# Patient Record
Sex: FEMALE | Race: WHITE | NOT HISPANIC OR LATINO | Employment: FULL TIME | URBAN - METROPOLITAN AREA
[De-identification: names, ages, dates, MRNs, and addresses within clinical notes are randomized per-mention and may not be internally consistent; named-entity substitution may affect disease eponyms.]

---

## 2017-05-31 ENCOUNTER — OFFICE VISIT (OUTPATIENT)
Dept: MEDICAL GROUP | Facility: MEDICAL CENTER | Age: 57
End: 2017-05-31
Payer: COMMERCIAL

## 2017-05-31 VITALS
BODY MASS INDEX: 24.41 KG/M2 | WEIGHT: 151.9 LBS | SYSTOLIC BLOOD PRESSURE: 124 MMHG | DIASTOLIC BLOOD PRESSURE: 68 MMHG | OXYGEN SATURATION: 97 % | HEART RATE: 88 BPM | HEIGHT: 66 IN | TEMPERATURE: 98.6 F | RESPIRATION RATE: 16 BRPM

## 2017-05-31 DIAGNOSIS — Z13.6 SCREENING FOR CARDIOVASCULAR CONDITION: ICD-10-CM

## 2017-05-31 DIAGNOSIS — R23.3 EASY BRUISING: ICD-10-CM

## 2017-05-31 DIAGNOSIS — R23.3 PETECHIAE: ICD-10-CM

## 2017-05-31 DIAGNOSIS — Z13.1 SCREENING FOR DIABETES MELLITUS: ICD-10-CM

## 2017-05-31 DIAGNOSIS — G93.9 WHITE MATTER LESION OF CENTRAL NERVOUS SYSTEM: ICD-10-CM

## 2017-05-31 PROCEDURE — 99204 OFFICE O/P NEW MOD 45 MIN: CPT | Performed by: PHYSICIAN ASSISTANT

## 2017-05-31 ASSESSMENT — PAIN SCALES - GENERAL: PAINLEVEL: NO PAIN

## 2017-05-31 NOTE — ASSESSMENT & PLAN NOTE
2001 MRI- showed white lesion, was told by Dr. Eaton she had MS since she also had weakness and tingling but this resolved per pt.   Treated with steroids and other meds.   One days she just stopped. Hasn't had f/u MRI.  Pt asx.   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 Does not want to take medication.

## 2017-05-31 NOTE — PROGRESS NOTES
Chief Complaint   Patient presents with   • Establish Care     HPI:   Pily Anderson is a 56 y.o. female here to establish care    Easy bruising/rash   Has been taking 3 ibuprofen almost daily for several years.   This is for prevention of her intermittent low back pain 2/2 horse riding mild injury. No fracture.   Does have some bleeding in gums with brushing teeth, bruising on tongue from random accidental biting- went to dentist and was told she needs cleaning- hasn't done this yet.   Noticed rash on lower legs 3 days ago. Small pin point red rash mostly lower legs less up to thighs. Today noticed more on arms.  Has not been taking ibuprofen for last couple days.   Denies unwanted weight loss, fever, chills, urinary sx, fatigue, joint pain, muscle aching.   Not currently sexually active and hasn't been for years.     Current medicines (including changes today)  No current outpatient prescriptions on file.     No current facility-administered medications for this visit.     She  has no past medical history on file.  She  has no past surgical history on file.  Social History   Substance Use Topics   • Smoking status: Never Smoker    • Smokeless tobacco: Never Used   • Alcohol Use: Yes      Comment: 2 glasses of wine weekly.      Social History     Social History Narrative   • No narrative on file     Family History   Problem Relation Age of Onset   • Alzheimer's Disease Paternal Grandmother    • Cancer Neg Hx    • Diabetes Neg Hx    • Heart Disease Neg Hx    • Stroke Neg Hx      No family status information on file.       ROS  Constitutional: Negative for fever, chills, weight loss  HENT: Negative for ear pain, nosebleeds, congestion, sore throat and neck pain.   Eyes: Negative for blurred vision.   Respiratory: Negative for cough, sputum production, shortness of breath and wheezing.   Cardiovascular: Negative for chest pain, palpitations, orthopnea and leg swelling.   Gastrointestinal: Negative for heartburn,  "nausea, vomiting and abdominal pain.   Genitourinary: Negative for dysuria, urgency and frequency.   Musculoskeletal: Negative for myalgias, back pain and joint pain.   Skin: + rash and neg itching.   Neurological: Negative for dizziness, tingling, tremors, sensory change, focal weakness and headaches.   Endo/Heme/Allergies: + bruise/bleed easily.   Psychiatric/Behavioral: Negative for depression, anxiety, or memory loss.   All other systems reviewed and are negative except as in HPI.     Objective:     Blood pressure 124/68, pulse 88, temperature 37 °C (98.6 °F), resp. rate 16, height 1.676 m (5' 6\"), weight 68.9 kg (151 lb 14.4 oz), SpO2 97 %, not currently breastfeeding. Body mass index is 24.53 kg/(m^2).  Physical Exam:    Constitutional: Alert, no distress.  Skin: Warm, dry, good turgor, lower legs with scattering of 1-4mm nonblanching erythematous macules, more dense and more distal on the legs. Very few noted on arms bilaterally. No scaling, edema, warmth. Does have random ecchymosis noted on legs.   Eye: PERRLA, conjunctiva clear, lids normal, conjunctiva pink  ENMT: Lips without lesions, dentition good but has petetichia on hard palate, bruising on tongue bilaterally and on cheeks bilaterally at bite line, no bleeding of gums today.  Neck: Trachea midline, no masses, no thyromegaly.  Lymph: no cervical, axillary or inguinal lymphadenopathy noted  Respiratory: Unlabored respiratory effort, lungs clear to auscultation, no wheezes, no ronchi.  Cardiovascular: Normal S1, S2, no murmur, no edema.  Abdomen: Soft, non-tender, no masses, no hepatosplenomegaly.  Psych: Alert and oriented x3, normal affect and mood.    Assessment and Plan:   The following treatment plan was discussed     Easy bruising/petechiae  Discussed differential diagnosis with patient  Which included ITP also, vasculitis, liver disease, myelodysplastic disorders, etc. we've elected up-to-date and printed a differential diagnosis for her. "   Still patient was very persistent that this is only a thrombocytopenia secondary to her ibuprofen use.   We discussed doing a full workup which was recommended. Pt requesting only to do a CBC.   Also would like to look at liver function and kidney function, patient apprehensive about this.  We will be ordering a CMP mostly because of screening for diabetes  There are also ordering a cholesterol for screening for cardiovascular  Was able to include sedimentation rate although she declined any other autoimmune tests or any other lab  Workups or biopsy.  Patient is of sound body and mind. 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   Has worked at [employer] is in [employer].   Therefore we discussed again the thorough workup that is recommended. Patient declines and we are going to be on the ordering labs for CBC, CMP and sedimentation rate.      Records requested.  Followup: Return in about 2 weeks (around 6/14/2017) for labs/rash, sooner if needed.           Please note that this dictation was created using voice recognition software. I have made every reasonable attempt to correct obvious errors, but I expect that there are errors of grammar and possibly content that I did not discover before finalizing the note.

## 2017-05-31 NOTE — MR AVS SNAPSHOT
"        Pily Anderson   2017 2:00 PM   Office Visit   MRN: [id number]    Department:  [place] [address]   Dept Phone:  [phone]    Description:  Female : 1960   Provider:  Brenda Davis PA-C           Reason for Visit     Establish Care           Allergies as of 2017     No Known Allergies      You were diagnosed with     White matter lesion of central nervous system   [7674450]       Rash   [028754]       Screening for cardiovascular condition   [036558]       Screening for diabetes mellitus   [V77.1.ICD-9-CM]       Easy bruising   [330609]       Petechiae   [072199]         Vital Signs     Blood Pressure Pulse Temperature Respirations Height Weight    124/68 mmHg 88 37 °C (98.6 °F) 16 1.676 m (5' 6\") 68.9 kg (151 lb 14.4 oz)    Body Mass Index Oxygen Saturation Breastfeeding? Smoking Status          24.53 kg/m2 97% No Never Smoker         Basic Information     Date Of Birth Sex Race Ethnicity Preferred Language    1960 Female White Non- English      Your appointments     2017  1:40 PM   Established Patient with Brenda Davis PA-C   [facility]    [address]  [address]  [facility] [address] [phone]           You will be receiving a confirmation call a few days before your appointment from our automated call confirmation system.              Problem List              ICD-10-CM Priority Class Noted - Resolved    White matter lesion of central nervous system G93.89   2017 - Present    Petechiae R23.3   2017 - Present      Health Maintenance     Patient has no pending health maintenance at this time      Current Immunizations     No immunizations on file.      Below and/or attached are the medications your provider expects you to take. Review all of your home medications and newly ordered medications with your provider and/or pharmacist. Follow medication instructions as directed by your provider " and/or pharmacist. Please keep your medication list with you and share with your provider. Update the information when medications are discontinued, doses are changed, or new medications (including over-the-counter products) are added; and carry medication information at all times in the event of emergency situations     Allergies:  No Known Allergies          Medications  Valid as of: May 31, 2017 -  2:53 PM    Generic Name Brand Name Tablet Size Instructions for use    .                 Medicines prescribed today were sent to:     [address]), [place] [address] [place] [address] [facility] [place] [address]    Phone: [phone] Fax: [phone]    Open 24 Hours?: No      Medication refill instructions:       If your prescription bottle indicates you have medication refills left, it is not necessary to call your provider’s office. Please contact your pharmacy and they will refill your medication.    If your prescription bottle indicates you do not have any refills left, you may request refills at any time through one of the following ways: The online imgfave system (except Urgent Care), by calling your provider’s office, or by asking your pharmacy to contact your provider’s office with a refill request. Medication refills are processed only during regular business hours and may not be available until the next business day. Your provider may request additional information or to have a follow-up visit with you prior to refilling your medication.   *Please Note: Medication refills are assigned a new Rx number when refilled electronically. Your pharmacy may indicate that no refills were authorized even though a new prescription for the same medication is available at the pharmacy. Please request the medicine by name with the pharmacy before contacting your provider for a refill.        Your To Do List     Future Labs/Procedures Complete By [facility] WITH DIFFERENTIAL  As directed 6/1/2018     COMP METABOLIC PANEL  As directed 6/1/2018    LIPID PROFILE  As directed 6/1/2018    WESTERGREN SED RATE  As directed 6/1/2018         MyChart Status: Patient Declined

## 2017-06-02 ENCOUNTER — HOSPITAL ENCOUNTER (INPATIENT)
Facility: MEDICAL CENTER | Age: 57
LOS: 2 days | DRG: 813 | End: 2017-06-04
Attending: EMERGENCY MEDICINE | Admitting: HOSPITALIST
Payer: COMMERCIAL

## 2017-06-02 ENCOUNTER — RESOLUTE PROFESSIONAL BILLING HOSPITAL PROF FEE (OUTPATIENT)
Dept: HOSPITALIST | Facility: MEDICAL CENTER | Age: 57
End: 2017-06-02
Payer: COMMERCIAL

## 2017-06-02 DIAGNOSIS — D69.6 THROMBOCYTOPENIA (HCC): ICD-10-CM

## 2017-06-02 PROBLEM — D69.3 ACUTE ITP (HCC): Status: ACTIVE | Noted: 2017-06-02

## 2017-06-02 LAB
ABO GROUP BLD: NORMAL
ABO GROUP BLD: NORMAL
ANION GAP SERPL CALC-SCNC: 8 MMOL/L (ref 0–11.9)
APTT PPP: 26 SEC (ref 24.7–36)
BASOPHILS # BLD AUTO: 0.6 % (ref 0–1.8)
BASOPHILS # BLD: 0.04 K/UL (ref 0–0.12)
BLD GP AB SCN SERPL QL: NORMAL
BUN SERPL-MCNC: 17 MG/DL (ref 8–22)
CALCIUM SERPL-MCNC: 10 MG/DL (ref 8.5–10.5)
CHLORIDE SERPL-SCNC: 104 MMOL/L (ref 96–112)
CO2 SERPL-SCNC: 27 MMOL/L (ref 20–33)
COMMENT 1642: NORMAL
CREAT SERPL-MCNC: 0.87 MG/DL (ref 0.5–1.4)
DEPRECATED D DIMER PPP IA-ACNC: <200 NG/ML(D-DU)
EOSINOPHIL # BLD AUTO: 0.06 K/UL (ref 0–0.51)
EOSINOPHIL NFR BLD: 1 % (ref 0–6.9)
ERYTHROCYTE [DISTWIDTH] IN BLOOD BY AUTOMATED COUNT: 39.2 FL (ref 35.9–50)
FIBRINOGEN PPP-MCNC: 324 MG/DL (ref 215–460)
GFR SERPL CREATININE-BSD FRML MDRD: >60 ML/MIN/1.73 M 2
GLUCOSE SERPL-MCNC: 98 MG/DL (ref 65–99)
HCT VFR BLD AUTO: 47.5 % (ref 37–47)
HGB BLD-MCNC: 15.6 G/DL (ref 12–16)
IMM GRANULOCYTES # BLD AUTO: 0.02 K/UL (ref 0–0.11)
IMM GRANULOCYTES NFR BLD AUTO: 0.3 % (ref 0–0.9)
INR PPP: 0.92 (ref 0.87–1.13)
LDH SERPL-CCNC: 134 U/L (ref 107–266)
LYMPHOCYTES # BLD AUTO: 0.85 K/UL (ref 1–4.8)
LYMPHOCYTES NFR BLD: 13.5 % (ref 22–41)
MCH RBC QN AUTO: 28.4 PG (ref 27–33)
MCHC RBC AUTO-ENTMCNC: 32.8 G/DL (ref 33.6–35)
MCV RBC AUTO: 86.5 FL (ref 81.4–97.8)
MONOCYTES # BLD AUTO: 0.48 K/UL (ref 0–0.85)
MONOCYTES NFR BLD AUTO: 7.6 % (ref 0–13.4)
MORPHOLOGY BLD-IMP: NORMAL
NEUTROPHILS # BLD AUTO: 4.86 K/UL (ref 2–7.15)
NEUTROPHILS NFR BLD: 77 % (ref 44–72)
NRBC # BLD AUTO: 0 K/UL
NRBC BLD AUTO-RTO: 0 /100 WBC
PLATELET # BLD AUTO: 1 K/UL (ref 164–446)
PLATELET BLD QL SMEAR: NORMAL
PLATELETS.RETICULATED NFR BLD AUTO: 0 K/UL (ref 0.6–13.1)
POTASSIUM SERPL-SCNC: 3.7 MMOL/L (ref 3.6–5.5)
PROTHROMBIN TIME: 12.6 SEC (ref 12–14.6)
RBC # BLD AUTO: 5.49 M/UL (ref 4.2–5.4)
RBC BLD AUTO: PRESENT
RH BLD: NORMAL
SODIUM SERPL-SCNC: 139 MMOL/L (ref 135–145)
VARIANT LYMPHS BLD QL SMEAR: NORMAL
WBC # BLD AUTO: 6.3 K/UL (ref 4.8–10.8)

## 2017-06-02 PROCEDURE — 700102 HCHG RX REV CODE 250 W/ 637 OVERRIDE(OP): Performed by: SPECIALIST

## 2017-06-02 PROCEDURE — 80048 BASIC METABOLIC PNL TOTAL CA: CPT

## 2017-06-02 PROCEDURE — 85379 FIBRIN DEGRADATION QUANT: CPT

## 2017-06-02 PROCEDURE — 86900 BLOOD TYPING SEROLOGIC ABO: CPT

## 2017-06-02 PROCEDURE — 30233S1 TRANSFUSION OF NONAUTOLOGOUS GLOBULIN INTO PERIPHERAL VEIN, PERCUTANEOUS APPROACH: ICD-10-PCS | Performed by: HOSPITALIST

## 2017-06-02 PROCEDURE — 86901 BLOOD TYPING SEROLOGIC RH(D): CPT

## 2017-06-02 PROCEDURE — 99285 EMERGENCY DEPT VISIT HI MDM: CPT

## 2017-06-02 PROCEDURE — 83615 LACTATE (LD) (LDH) ENZYME: CPT

## 2017-06-02 PROCEDURE — 85384 FIBRINOGEN ACTIVITY: CPT

## 2017-06-02 PROCEDURE — 770009 HCHG ROOM/CARE - ONCOLOGY SEMI PRI*

## 2017-06-02 PROCEDURE — 85055 RETICULATED PLATELET ASSAY: CPT

## 2017-06-02 PROCEDURE — 85610 PROTHROMBIN TIME: CPT

## 2017-06-02 PROCEDURE — 86850 RBC ANTIBODY SCREEN: CPT

## 2017-06-02 PROCEDURE — A9270 NON-COVERED ITEM OR SERVICE: HCPCS | Performed by: SPECIALIST

## 2017-06-02 PROCEDURE — 85025 COMPLETE CBC W/AUTO DIFF WBC: CPT

## 2017-06-02 PROCEDURE — 85730 THROMBOPLASTIN TIME PARTIAL: CPT

## 2017-06-02 PROCEDURE — 99223 1ST HOSP IP/OBS HIGH 75: CPT | Performed by: HOSPITALIST

## 2017-06-02 PROCEDURE — 36415 COLL VENOUS BLD VENIPUNCTURE: CPT

## 2017-06-02 PROCEDURE — 700111 HCHG RX REV CODE 636 W/ 250 OVERRIDE (IP): Performed by: HOSPITALIST

## 2017-06-02 RX ORDER — OMEPRAZOLE 20 MG/1
20 CAPSULE, DELAYED RELEASE ORAL EVERY 12 HOURS
Status: DISCONTINUED | OUTPATIENT
Start: 2017-06-02 | End: 2017-06-04 | Stop reason: HOSPADM

## 2017-06-02 RX ORDER — IMMUNE GLOBULIN 50 MG/ML
10 SOLUTION INTRAVENOUS DAILY
Status: DISCONTINUED | OUTPATIENT
Start: 2017-06-02 | End: 2017-06-03

## 2017-06-02 RX ORDER — METHYLPREDNISOLONE SODIUM SUCCINATE 125 MG/2ML
125 INJECTION, POWDER, LYOPHILIZED, FOR SOLUTION INTRAMUSCULAR; INTRAVENOUS EVERY 12 HOURS
Status: DISCONTINUED | OUTPATIENT
Start: 2017-06-02 | End: 2017-06-04 | Stop reason: HOSPADM

## 2017-06-02 RX ORDER — IBUPROFEN 200 MG
800 TABLET ORAL
Status: ON HOLD | COMMUNITY
End: 2017-06-04

## 2017-06-02 RX ORDER — BISACODYL 10 MG
10 SUPPOSITORY, RECTAL RECTAL
Status: DISCONTINUED | OUTPATIENT
Start: 2017-06-02 | End: 2017-06-02

## 2017-06-02 RX ORDER — AMOXICILLIN 250 MG
2 CAPSULE ORAL 2 TIMES DAILY
Status: DISCONTINUED | OUTPATIENT
Start: 2017-06-02 | End: 2017-06-04 | Stop reason: HOSPADM

## 2017-06-02 RX ORDER — ACETAMINOPHEN 325 MG/1
650 TABLET ORAL EVERY 6 HOURS PRN
Status: DISCONTINUED | OUTPATIENT
Start: 2017-06-02 | End: 2017-06-04 | Stop reason: HOSPADM

## 2017-06-02 RX ORDER — POLYETHYLENE GLYCOL 3350 17 G/17G
1 POWDER, FOR SOLUTION ORAL
Status: DISCONTINUED | OUTPATIENT
Start: 2017-06-02 | End: 2017-06-04 | Stop reason: HOSPADM

## 2017-06-02 RX ADMIN — OMEPRAZOLE 20 MG: 20 CAPSULE, DELAYED RELEASE ORAL at 23:16

## 2017-06-02 RX ADMIN — IMMUNE GLOBULIN 10 G: 50 SOLUTION INTRAVENOUS at 21:02

## 2017-06-02 RX ADMIN — IMMUNE GLOBULIN 10 G: 50 SOLUTION INTRAVENOUS at 23:17

## 2017-06-02 ASSESSMENT — PAIN SCALES - GENERAL
PAINLEVEL_OUTOF10: 0
PAINLEVEL_OUTOF10: 0

## 2017-06-02 NOTE — IP AVS SNAPSHOT
" <p align=\"LEFT\"><IMG SRC=\"//[email or web address]$/[email or web address]\" alt=\"Image\" WIDTH=\"50%\" HEIGHT=\"200\" BORDER=\"\"></p>                   Name:Pily Anderson  Medical Record Number:[id number]  CSN: [id number]    YOB: 1960   Age: 56 y.o.  Sex: female  HT:1.676 m (5' 6\") WT: 66.4 kg (146 lb 6.2 oz)          Admit Date: 6/2/2017     Discharge Date:   Today's Date: 6/4/2017  Attending Doctor:  Annette Cavazos M.D.                  Allergies:  Review of patient's allergies indicates no known allergies.          Your appointments     Jun 13, 2017  1:40 PM   Established Patient with Brenda Davis PA-C   [facility]    [address]  [facility] [address]  [facility] [address] [phone]           You will be receiving a confirmation call a few days before your appointment from our automated call confirmation system.              Follow-up Information     1. Follow up with MADDISON Coronado M.D.. Schedule an appointment as soon as possible for a visit in 10 days.    Specialty:  Oncology    Why:  for follow up as discussed    Contact information    [address] [facility] [address] [phone]           Medication List      Take these Medications        Instructions    dexamethasone 4 MG Tabs   Commonly known as:  DECADRON    Take 10 Tabs by mouth 2 times a day.   Dose:  40 mg       omeprazole 20 MG delayed-release capsule   Commonly known as:  PRILOSEC    Take 1 Cap by mouth every 12 hours.   Dose:  20 mg         "

## 2017-06-02 NOTE — ED NOTES
"Chief Complaint   Patient presents with   • Sent by MD   • Abnormal Labs     \"low platelets\"    Pt to triage in NAD.  Pt reports she noticed petechiae on BLE approximately 1 week ago and that she has been bruising more easily.  Pt reports she has taken ibuprofen daily for several years for back pain.  Pt educated on triage process and instructed to notify triage RN of any change in status.               "

## 2017-06-02 NOTE — IP AVS SNAPSHOT
6/4/2017    Pily WASSERMAN Delfinorobert  [address]  [place] [address]    Dear Pily:    [facility] wants to ensure your discharge home is safe and you or your loved ones have had all of your questions answered regarding your care after you leave the hospital.    Below is a list of resources and contact information should you have any questions regarding your hospital stay, follow-up instructions, or active medical symptoms.    Questions or Concerns Regarding… Contact   Medical Questions Related to Your Discharge  (7 days a week, 8am-5pm) Contact a Nurse Care Coordinator   [phone]   Medical Questions Not Related to Your Discharge  (24 hours a day / 7 days a week)  Contact the Nurse Health Line   [phone]    Medications or Discharge Instructions Refer to your discharge packet   or contact your [facility] Primary Care Provider   [phone]   Follow-up Appointment(s) Schedule your appointment via Socure   or contact Scheduling [phone]   Billing Review your statement via Socure  or contact Billing [phone]   Medical Records Review your records via Socure   or contact Medical Records [phone]     You may receive a telephone call within two days of discharge. This call is to make certain you understand your discharge instructions and have the opportunity to have any questions answered. You can also easily access your medical information, test results and upcoming appointments via the Socure free online health management tool. You can learn more and sign up at [employer]/Socure. For assistance setting up your Socure account, please call [phone].    Once again, we want to ensure your discharge home is safe and that you have a clear understanding of any next steps in your care. If you have any questions or concerns, please do not hesitate to contact us, we are here for you. Thank you for choosing [facility] for your healthcare needs.    Sincerely,    Your [facility] Healthcare Team

## 2017-06-02 NOTE — ED NOTES
Tech from Lab called with critical result of platelets of 1 at 1645. Critical lab result read back to tech.   Dr. Johnson notified of critical lab result at 1645.  Critical lab result read back by Dr. Johnson.

## 2017-06-02 NOTE — IP AVS SNAPSHOT
Insmed Access Code: [email or web address]  Expires: 6/30/2017  2:55 PM    Insmed  A secure, online tool to manage your health information     [employer]’s Insmed® is a secure, online tool that connects you to your personalized health information from the privacy of your home -- day or night - making it very easy for you to manage your healthcare. Once the activation process is completed, you can even access your medical information using the Insmed colby, which is available for free in the Apple Colby store or Google Play store.     Insmed provides the following levels of access (as shown below):   My Chart Features   [facility] Primary Care Doctor [facility]  Specialists [facility]  Urgent  Care Non-[facility]  Primary Care  Doctor   Email your healthcare team securely and privately 24/7 X X X X   Manage appointments: schedule your next appointment; view details of past/upcoming appointments X      Request prescription refills. X      View recent personal medical records, including lab and immunizations X X X X   View health record, including health history, allergies, medications X X X X   Read reports about your outpatient visits, procedures, consult and ER notes X X X X   See your discharge summary, which is a recap of your hospital and/or ER visit that includes your diagnosis, lab results, and care plan. X X       How to register for Insmed:  1. Go to  [email or web address].  2. Click on the Sign Up Now box, which takes you to the New Member Sign Up page. You will need to provide the following information:  a. Enter your Insmed Access Code exactly as it appears at the top of this page. (You will not need to use this code after you’ve completed the sign-up process. If you do not sign up before the expiration date, you must request a new code.)   b. Enter your date of birth.   c. Enter your home email address.   d. Click Submit, and follow the next screen’s instructions.  3. Create a Insmed ID. This will be your Insmed  login ID and cannot be changed, so think of one that is secure and easy to remember.  4. Create a [employer] password. You can change your password at any time.  5. Enter your Password Reset Question and Answer. This can be used at a later time if you forget your password.   6. Enter your e-mail address. This allows you to receive e-mail notifications when new information is available in [employer].  7. Click Sign Up. You can now view your health information.    For assistance activating your [employer] account, call [phone]

## 2017-06-02 NOTE — IP AVS SNAPSHOT
" Home Care Instructions                                                                                                                  Name:Pily Anderson  Medical Record Number:[id number]  CSN: [id number]    YOB: 1960   Age: 56 y.o.  Sex: female  HT:1.676 m (5' 6\") WT: 66.4 kg (146 lb 6.2 oz)          Admit Date: 6/2/2017     Discharge Date:   Today's Date: 6/4/2017  Attending Doctor:  Annette Cavazos M.D.                  Allergies:  Review of patient's allergies indicates no known allergies.            Discharge Instructions       Discharge Instructions    Discharged to home by car with self. Discharged via wheelchair, hospital escort: Yes.  Special equipment needed: Not Applicable    Be sure to schedule a follow-up appointment with your primary care doctor or any specialists as instructed.     Discharge Plan:   Diet Plan: Discussed  Activity Level: Discussed  Confirmed Follow up Appointment: Patient to Call and Schedule Appointment  Confirmed Symptoms Management: Discussed  Medication Reconciliation Updated: Yes  Influenza Vaccine Indication: Patient Refuses    I understand that a diet low in cholesterol, fat, and sodium is recommended for good health. Unless I have been given specific instructions below for another diet, I accept this instruction as my diet prescription.   Other diet: regular as tolerated    Special Instructions:     See Dr Coronado in office in 7-10 days    Lab work late next week (Thursday or Friday as discussed)    High dose Decadron for four days; asked patient to consider splenectomy if recurrence    · Is patient discharged on Warfarin / Coumadin?   No     · Is patient Post Blood Transfusion?  No      Idiopathic Thrombocytopenic Purpura  Idiopathic thrombocytopenic purpura (ITP) is a disease in which your body's defense system (immune system) attacks your platelets. Platelets are blood cells that help form clots and seal leaks in damaged blood vessels. With ITP, you to have " too few platelets. As a result, you bleed more easily. It is also harder for your body to stop any bleeding.  In adults, ITP is usually a long-term disease.  CAUSES  The cause is unknown. ITP may develop after a viral infection, during pregnancy, or from an immune disorder.  RISK FACTORS  The risk of ITP may be greater among:  Women.  Adults 20-50 years old.  SIGNS AND SYMPTOMS  Common signs and symptoms include:  Easy bruising.  A cut that bleeds for a long time.  Tiny purple blood dots (petechiae) under the skin, especially on the shins.  Blood in urine or bowel movements.  Nosebleeds.  Bleeding gums.  Heavy menstrual periods.  Mild forms of ITP may not cause any symptoms.  DIAGNOSIS  Your health care provider may suspect ITP based on your signs and symptoms. To make a diagnosis, your health care provider may do a physical exam and order blood tests to:  Find how many platelets you have.  See how well your blood clots.  Your health care provider may then do blood tests or a bone marrow test to rule out other conditions that could be causing your symptoms.  TREATMENT  Treatment depends on the severity of your condition. Options include:  Monitoring of your condition and platelet count.  Blood transfusions of antibodies or platelets.  Medicines such as:  Strong anti-inflammatory medicines (steroids).  Medicines to boost platelet production.  Medicines to suppress your immune system.  Removal of your spleen. This may be done if other treatments do not work.  HOME CARE INSTRUCTIONS  Take medicines only as directed by your health care provider.  Do not take over-the-counter medicines that lower your platelet count, affect platelet function, or affect your blood's ability to clot. These include aspirin and ibuprofen.  Do not participate in contact sports or other high-risk activities. Ask your health care provider which activities are safe for you.  Keep all follow-up visits as directed by your health care provider.  This is important.  SEEK MEDICAL CARE IF:  You have new symptoms.  Your symptoms get worse.  SEEK IMMEDIATE MEDICAL CARE IF:  You have a sudden severe headache or confusion.  You have significant bleeding.  You have nausea and vomiting.     This information is not intended to replace advice given to you by your health care provider. Make sure you discuss any questions you have with your health care provider.     Document Released: 07/15/2015 Document Reviewed: 07/15/2015  [employer] Interactive Patient Education ©2016 [employer]      Depression / Suicide Risk    As you are discharged from this [facility] facility, it is important to learn how to keep safe from harming yourself.    Recognize the warning signs:  · Abrupt changes in personality, positive or negative- including increase in energy   · Giving away possessions  · Change in eating patterns- significant weight changes-  positive or negative  · Change in sleeping patterns- unable to sleep or sleeping all the time   · Unwillingness or inability to communicate  · Depression  · Unusual sadness, discouragement and loneliness  · Talk of wanting to die  · Neglect of personal appearance   · Rebelliousness- reckless behavior  · Withdrawal from people/activities they love  · Confusion- inability to concentrate     If you or a loved one observes any of these behaviors or has concerns about self-harm, here's what you can do:  · Talk about it- your feelings and reasons for harming yourself  · Remove any means that you might use to hurt yourself (examples: pills, rope, extension cords, firearm)  · Get professional help from the community (Mental Health, Substance Abuse, psychological counseling)  · Do not be alone:Call your Safe Contact- someone whom you trust who will be there for you.  · Call your local CRISIS HOTLINE 407-2893 or 080-076-1043  · Call your local Children's Mobile Crisis Response Team [place] (793) 314-2050 or www.[facility]  · Call the  toll free National Suicide Prevention Hotlines   · National Suicide Prevention Lifeline 510-111-JZQT (9930)  · National Hope Line Network 800-SUICIDE (764-7627)        Your appointments     Jun 13, 2017  1:40 PM   [facility]    [address]  [address]  [place] [id number] [phone]           You will be receiving a confirmation call a few days before your appointment from our automated call confirmation system.              Follow-up Information     1. Follow up with MADDISON Coronado M.D.. Schedule an appointment as soon as possible for a visit in 10 days.    Specialty:  Oncology    Why:  for follow up as discussed    Contact information    [address] [place] [address] [phone]           Discharge Medication Instructions:    Below are the medications your physician expects you to take upon discharge:    Review all your home medications and newly ordered medications with your doctor and/or pharmacist. Follow medication instructions as directed by your doctor and/or pharmacist.    Please keep your medication list with you and share with your physician.               Medication List      START taking these medications        Instructions    Morning Afternoon Evening Bedtime    dexamethasone 4 MG Tabs   Commonly known as:  DECADRON        Take 10 Tabs by mouth 2 times a day.   Dose:  40 mg                        omeprazole 20 MG delayed-release capsule   Last time this was given:  20 mg on 6/3/2017  8:30 PM   Commonly known as:  PRILOSEC        Take 1 Cap by mouth every 12 hours.   Dose:  20 mg                          STOP taking these medications     asa/apap/caffeine 250-250-65 MG Tabs   Commonly known as:  EXCEDRIN               ibuprofen 200 MG Tabs   Commonly known as:  MOTRIN                    Where to Get Your Medications      These medications were sent to [facility] [place] -  [address]  [address] [place] [address]     Phone:  [phone]    - dexamethasone 4 MG Tabs  - omeprazole 20 MG delayed-release capsule            Instructions           Diet / Nutrition:    Follow any diet instructions given to you by your doctor or the dietician, including how much salt (sodium) you are allowed each day.    If you are overweight, talk to your doctor about a weight reduction plan.    Activity:    Remain physically active following your doctor's instructions about exercise and activity.    Rest often.     Any time you become even a little tired or short of breath, SIT DOWN and rest.    Worsening Symptoms:    Report any of the following signs and symptoms to the doctor's office immediately:    *Pain of jaw, arm, or neck  *Chest pain not relieved by medication                               *Dizziness or loss of consciousness  *Difficulty breathing even when at rest   *More tired than usual                                       *Bleeding drainage or swelling of surgical site  *Swelling of feet, ankles, legs or stomach                 *Fever (>100ºF)  *Pink or blood tinged sputum  *Weight gain (3lbs/day or 5lbs /week)           *Shock from internal defibrillator (if applicable)  *Palpitations or irregular heartbeats                *Cool and/or numb extremities    Stroke Awareness    Common Risk Factors for Stroke include:    Age  Atrial Fibrillation  Carotid Artery Stenosis  Diabetes Mellitus  Excessive alcohol consumption  High blood pressure  Overweight   Physical inactivity  Smoking    Warning signs and symptoms of a stroke include:    *Sudden numbness or weakness of the face, arm or leg (especially on one side of the body).  *Sudden confusion, trouble speaking or understanding.  *Sudden trouble seeing in one or both eyes.  *Sudden trouble walking, dizziness, loss of balance or coordination.Sudden severe headache with no known cause.    It is very important to get treatment quickly  when a stroke occurs. If you experience any of the above warning signs, call 911 immediately.                   Disclaimer         Quit Smoking / Tobacco Use:    I understand the use of any tobacco products increases my chance of suffering from future heart disease or stroke and could cause other illnesses which may shorten my life. Quitting the use of tobacco products is the single most important thing I can do to improve my health. For further information on smoking / tobacco cessation call a Toll Free Quit Line at 1-325.511.4324 (*National Cancer [place]) or 1-501.479.9472 (American Lung Association) or you can access the web based program at www.lungusa.org.    [place] Tobacco Users Help Line:  (803) 447-9728       Toll Free: 1-394.248.6141  Quit Tobacco Program [facility] (846)977-7234    Crisis Hotline:    [place] Crisis Hotline:  2-498-ECZYAOZ or 1-242.562.1865    [place] Crisis Hotline:    1-134.963.6739 or 886-919-7457    Discharge Survey:   Thank you for choosing [facility]. We hope we did everything we could to make your hospital stay a pleasant one. You may be receiving a phone survey and we would appreciate your time and participation in answering the questions. Your input is very valuable to us in our efforts to improve our service to our patients and their families.        My signature on this form indicates that:    1. I have reviewed and understand the above information.  2. My questions regarding this information have been answered to my satisfaction.  3. I have formulated a plan with my discharge nurse to obtain my prescribed medications for home.                  Disclaimer         __________________________________                     __________       ________                       Patient Signature                                                 Date                    Time

## 2017-06-02 NOTE — ED PROVIDER NOTES
"ED Provider Note    Scribed for Suzie Johnson M.D. by Tamara Gaitan. 6/2/2017, 3:03 PM.    Primary care provider: Brenda Davis PA-C  Means of arrival: Walk-in  History obtained from: Patient   History limited by: None    CHIEF COMPLAINT  Chief Complaint   Patient presents with   • Sent by MD   • Abnormal Labs     \"low platelets\"        HPI  Pily Anderson is a 56 y.o. female who presents to the Emergency Department for low platelets. The patient was told to report to the ED after her PABrenda received lab results from [employer] that indicated the patient had low platelets. She reports that she has been bruising easily. The patient also states that when she brushed her teeth she noticed reddening in her tongue which has improved today. She denies any nose bleeds. The patient takes 600mg of Ibuprofen once a day, 5 days week  secondary to back pain. She also takes Excedrin for headaches. The patient denies being on antibiotics recently. Her mother had history of low platelets.      REVIEW OF SYSTEMS  Pertinent positives include low platelet count, bruising. Pertinent negatives include no fever, nose bleeds, vomiting. See HPI for further details. All other systems reviewed and negative.   C.    PAST MEDICAL HISTORY   No past medical history noted.     SURGICAL HISTORY  patient denies any surgical history    SOCIAL HISTORY  Social History   Substance Use Topics   • Smoking status: Never Smoker    • Smokeless tobacco: Never Used   • Alcohol Use: Yes      Comment: 2 glasses of wine weekly.       History   Drug Use No     Patient is originally from [place]. She has a medical degree as well as a PhD in biochemistry. She is currently working as a .    FAMILY HISTORY  Family History   Problem Relation Age of Onset   • Alzheimer's Disease Paternal Grandmother    • Cancer Neg Hx    • Diabetes Neg Hx    • Heart Disease Neg Hx    • Stroke Neg Hx        CURRENT MEDICATIONS  Ibuprofen and Excerdrin " "      ALLERGIES  No Known Allergies    PHYSICAL EXAM  VITAL SIGNS: /84 mmHg  Pulse 87  Temp(Src) 37.3 °C (99.1 °F) (Temporal)  Resp 16  Ht 1.676 m (5' 6\")  Wt 66.1 kg (145 lb 11.6 oz)  BMI 23.53 kg/m2  SpO2 100%    General: WDWN, nontoxic appearing in NAD; A+Ox3; V/S as above    Skin: warm and dry; good color; no rash. Petechiae to bilateral legs with multiple areas of ecchymosis over her extremities  HEENT: NCAT; EOMs intact; PERRL; no scleral icterus   Neck: FROM; no meningismus, no LAD   Cardiovascular: Regular heart rate and rhythm. No murmurs, rubs, or gallops; pulses 2+ bilaterally radially and DP areas   Lungs: Clear to auscultation with good air movement bilaterally. No wheezes, rhonchi, or rales.   Abdomen: BS present; soft; NTND; no rebound, guarding, or rigidity. No organomegaly or pulsatile mass  Extremities: CHOWDHURY x 4; no pedal edema   Neurologic: CNs III-XII grossly intact; speech clear; distal sensation intact; strength 5/5 UE/LEs  Psychiatric: Appropriate affect, normal mood                                                           DIAGNOSTIC STUDIES / PROCEDURES    LABS  Results for orders placed or performed during the hospital encounter of 06/02/17   CBC WITH DIFFERENTIAL   Result Value Ref Range    WBC 6.3 4.8 - 10.8 K/uL    RBC 5.49 (H) 4.20 - 5.40 M/uL    Hemoglobin 15.6 12.0 - 16.0 g/dL    Hematocrit 47.5 (H) 37.0 - 47.0 %    MCV 86.5 81.4 - 97.8 fL    MCH 28.4 27.0 - 33.0 pg    MCHC 32.8 (L) 33.6 - 35.0 g/dL    RDW 39.2 35.9 - 50.0 fL    Platelet Count 1 (LL) 164 - 446 K/uL    Neutrophils-Polys 77.00 (H) 44.00 - 72.00 %    Lymphocytes 13.50 (L) 22.00 - 41.00 %    Monocytes 7.60 0.00 - 13.40 %    Eosinophils 1.00 0.00 - 6.90 %    Basophils 0.60 0.00 - 1.80 %    Immature Granulocytes 0.30 0.00 - 0.90 %    Nucleated RBC 0.00 /100 WBC    Neutrophils (Absolute) 4.86 2.00 - 7.15 K/uL    Lymphs (Absolute) 0.85 (L) 1.00 - 4.80 K/uL    Monos (Absolute) 0.48 0.00 - 0.85 K/uL    Eos " (Absolute) 0.06 0.00 - 0.51 K/uL    Baso (Absolute) 0.04 0.00 - 0.12 K/uL    Immature Granulocytes (abs) 0.02 0.00 - 0.11 K/uL    NRBC (Absolute) 0.00 K/uL   BASIC METABOLIC PANEL   Result Value Ref Range    Sodium 139 135 - 145 mmol/L    Potassium 3.7 3.6 - 5.5 mmol/L    Chloride 104 96 - 112 mmol/L    Co2 27 20 - 33 mmol/L    Glucose 98 65 - 99 mg/dL    Bun 17 8 - 22 mg/dL    Creatinine 0.87 0.50 - 1.40 mg/dL    Calcium 10.0 8.5 - 10.5 mg/dL    Anion Gap 8.0 0.0 - 11.9   PROTHROMBIN TIME   Result Value Ref Range    PT 12.6 12.0 - 14.6 sec    INR 0.92 0.87 - 1.13   ESTIMATED GFR   Result Value Ref Range    GFR If African American >60 >60 mL/min/1.73 m 2    GFR If Non African American >60 >60 mL/min/1.73 m 2     All labs reviewed by me.    COURSE & MEDICAL DECISION MAKING  Pertinent Labs & Imaging studies reviewed. (See chart for details)    Pily Anderson is a 56 y.o. female who presents complaining of low platelets. These were found on an outpatient draw 2 days ago. She just received notice from her PCP to come to the ER today. Patient reports noting petechiae several days ago. They are persistent. She has multiple areas of ecchymosis. I suspect ITP. She does not have a fever and has not altered. I do not suspect TTP.      3:03 PM - Patient seen and examined at bedside. Ordered CBC with differential, BMP, Prothrombin Time to evaluate her symptoms.    4:50 PM I discussed the case with Dr. Martínez from the hospitalist service who agrees with the patient. I will call hematology.    We were called by the lab with platelet count of 1.    4:58 PM - I discussed the patient's case and the above findings with hematology who requests LDH be added to her labs. He will call Dr. Martínez with recommendations.      DISPOSITION:  Patient will be admitted to Dr. Martínez (Hospitalist) in guarded condition.      FINAL IMPRESSION  1. Thrombocytopenia (CMS-HCC)          ITamara (Scribe), am scribing for, and in the  presence of, Suzie Johnson M.D..    Electronically signed by: Tamara Gaitan (Scribe), 6/2/2017    I, Suzie Johnson M.D. personally performed the services described in this documentation, as scribed by Tamara Gaitan in my presence, and it is both accurate and complete.    The note accurately reflects work and decisions made by me.  Suzie Johnson  6/2/2017  10:10 PM

## 2017-06-03 LAB
ALBUMIN SERPL BCP-MCNC: 3.8 G/DL (ref 3.2–4.9)
ALBUMIN SERPL BCP-MCNC: 4 G/DL (ref 3.2–4.9)
ALBUMIN/GLOB SERPL: 0.8 G/DL
ALBUMIN/GLOB SERPL: 1.2 G/DL
ALP SERPL-CCNC: 64 U/L (ref 30–99)
ALP SERPL-CCNC: 70 U/L (ref 30–99)
ALT SERPL-CCNC: 11 U/L (ref 2–50)
ALT SERPL-CCNC: 9 U/L (ref 2–50)
ANION GAP SERPL CALC-SCNC: 7 MMOL/L (ref 0–11.9)
ANION GAP SERPL CALC-SCNC: 8 MMOL/L (ref 0–11.9)
AST SERPL-CCNC: 11 U/L (ref 12–45)
AST SERPL-CCNC: 12 U/L (ref 12–45)
BASOPHILS # BLD AUTO: 0.7 % (ref 0–1.8)
BASOPHILS # BLD AUTO: 0.7 % (ref 0–1.8)
BASOPHILS # BLD: 0.02 K/UL (ref 0–0.12)
BASOPHILS # BLD: 0.03 K/UL (ref 0–0.12)
BILIRUB SERPL-MCNC: 0.7 MG/DL (ref 0.1–1.5)
BILIRUB SERPL-MCNC: 0.7 MG/DL (ref 0.1–1.5)
BUN SERPL-MCNC: 14 MG/DL (ref 8–22)
BUN SERPL-MCNC: 19 MG/DL (ref 8–22)
CALCIUM SERPL-MCNC: 9.2 MG/DL (ref 8.5–10.5)
CALCIUM SERPL-MCNC: 9.5 MG/DL (ref 8.5–10.5)
CHLORIDE SERPL-SCNC: 103 MMOL/L (ref 96–112)
CHLORIDE SERPL-SCNC: 106 MMOL/L (ref 96–112)
CO2 SERPL-SCNC: 24 MMOL/L (ref 20–33)
CO2 SERPL-SCNC: 25 MMOL/L (ref 20–33)
CREAT SERPL-MCNC: 0.94 MG/DL (ref 0.5–1.4)
CREAT SERPL-MCNC: 0.94 MG/DL (ref 0.5–1.4)
EOSINOPHIL # BLD AUTO: 0.03 K/UL (ref 0–0.51)
EOSINOPHIL # BLD AUTO: 0.08 K/UL (ref 0–0.51)
EOSINOPHIL NFR BLD: 0.7 % (ref 0–6.9)
EOSINOPHIL NFR BLD: 2.8 % (ref 0–6.9)
ERYTHROCYTE [DISTWIDTH] IN BLOOD BY AUTOMATED COUNT: 38.5 FL (ref 35.9–50)
ERYTHROCYTE [DISTWIDTH] IN BLOOD BY AUTOMATED COUNT: 38.6 FL (ref 35.9–50)
GFR SERPL CREATININE-BSD FRML MDRD: >60 ML/MIN/1.73 M 2
GFR SERPL CREATININE-BSD FRML MDRD: >60 ML/MIN/1.73 M 2
GLOBULIN SER CALC-MCNC: 3.1 G/DL (ref 1.9–3.5)
GLOBULIN SER CALC-MCNC: 4.9 G/DL (ref 1.9–3.5)
GLUCOSE SERPL-MCNC: 117 MG/DL (ref 65–99)
GLUCOSE SERPL-MCNC: 119 MG/DL (ref 65–99)
HCT VFR BLD AUTO: 43.4 % (ref 37–47)
HCT VFR BLD AUTO: 44.3 % (ref 37–47)
HGB BLD-MCNC: 14.3 G/DL (ref 12–16)
HGB BLD-MCNC: 14.7 G/DL (ref 12–16)
IMM GRANULOCYTES # BLD AUTO: 0.01 K/UL (ref 0–0.11)
IMM GRANULOCYTES # BLD AUTO: 0.01 K/UL (ref 0–0.11)
IMM GRANULOCYTES NFR BLD AUTO: 0.2 % (ref 0–0.9)
IMM GRANULOCYTES NFR BLD AUTO: 0.3 % (ref 0–0.9)
LYMPHOCYTES # BLD AUTO: 0.38 K/UL (ref 1–4.8)
LYMPHOCYTES # BLD AUTO: 0.83 K/UL (ref 1–4.8)
LYMPHOCYTES NFR BLD: 28.7 % (ref 22–41)
LYMPHOCYTES NFR BLD: 8.6 % (ref 22–41)
MAGNESIUM SERPL-MCNC: 1.8 MG/DL (ref 1.5–2.5)
MCH RBC QN AUTO: 28.4 PG (ref 27–33)
MCH RBC QN AUTO: 28.7 PG (ref 27–33)
MCHC RBC AUTO-ENTMCNC: 32.9 G/DL (ref 33.6–35)
MCHC RBC AUTO-ENTMCNC: 33.2 G/DL (ref 33.6–35)
MCV RBC AUTO: 85.7 FL (ref 81.4–97.8)
MCV RBC AUTO: 87 FL (ref 81.4–97.8)
MONOCYTES # BLD AUTO: 0.15 K/UL (ref 0–0.85)
MONOCYTES # BLD AUTO: 0.32 K/UL (ref 0–0.85)
MONOCYTES NFR BLD AUTO: 11.1 % (ref 0–13.4)
MONOCYTES NFR BLD AUTO: 3.4 % (ref 0–13.4)
NEUTROPHILS # BLD AUTO: 1.63 K/UL (ref 2–7.15)
NEUTROPHILS # BLD AUTO: 3.8 K/UL (ref 2–7.15)
NEUTROPHILS NFR BLD: 56.4 % (ref 44–72)
NEUTROPHILS NFR BLD: 86.4 % (ref 44–72)
NRBC # BLD AUTO: 0 K/UL
NRBC # BLD AUTO: 0 K/UL
NRBC BLD AUTO-RTO: 0 /100 WBC
NRBC BLD AUTO-RTO: 0 /100 WBC
PLATELET # BLD AUTO: 12 K/UL (ref 164–446)
PLATELET # BLD AUTO: 2 K/UL (ref 164–446)
PMV BLD AUTO: 13.8 FL (ref 9–12.9)
PMV BLD AUTO: 14.3 FL (ref 9–12.9)
POTASSIUM SERPL-SCNC: 3.6 MMOL/L (ref 3.6–5.5)
POTASSIUM SERPL-SCNC: 3.6 MMOL/L (ref 3.6–5.5)
PROT SERPL-MCNC: 6.9 G/DL (ref 6–8.2)
PROT SERPL-MCNC: 8.9 G/DL (ref 6–8.2)
RBC # BLD AUTO: 4.99 M/UL (ref 4.2–5.4)
RBC # BLD AUTO: 5.17 M/UL (ref 4.2–5.4)
SODIUM SERPL-SCNC: 135 MMOL/L (ref 135–145)
SODIUM SERPL-SCNC: 138 MMOL/L (ref 135–145)
WBC # BLD AUTO: 2.9 K/UL (ref 4.8–10.8)
WBC # BLD AUTO: 4.4 K/UL (ref 4.8–10.8)

## 2017-06-03 PROCEDURE — 80053 COMPREHEN METABOLIC PANEL: CPT | Mod: 91

## 2017-06-03 PROCEDURE — 99232 SBSQ HOSP IP/OBS MODERATE 35: CPT | Performed by: HOSPITALIST

## 2017-06-03 PROCEDURE — 700111 HCHG RX REV CODE 636 W/ 250 OVERRIDE (IP): Performed by: HOSPITALIST

## 2017-06-03 PROCEDURE — 770009 HCHG ROOM/CARE - ONCOLOGY SEMI PRI*

## 2017-06-03 PROCEDURE — 36415 COLL VENOUS BLD VENIPUNCTURE: CPT

## 2017-06-03 PROCEDURE — 700102 HCHG RX REV CODE 250 W/ 637 OVERRIDE(OP): Performed by: HOSPITALIST

## 2017-06-03 PROCEDURE — 85025 COMPLETE CBC W/AUTO DIFF WBC: CPT

## 2017-06-03 PROCEDURE — 700102 HCHG RX REV CODE 250 W/ 637 OVERRIDE(OP): Performed by: SPECIALIST

## 2017-06-03 PROCEDURE — A9270 NON-COVERED ITEM OR SERVICE: HCPCS | Performed by: SPECIALIST

## 2017-06-03 PROCEDURE — 700111 HCHG RX REV CODE 636 W/ 250 OVERRIDE (IP): Performed by: SPECIALIST

## 2017-06-03 PROCEDURE — A9270 NON-COVERED ITEM OR SERVICE: HCPCS | Performed by: HOSPITALIST

## 2017-06-03 PROCEDURE — 83735 ASSAY OF MAGNESIUM: CPT

## 2017-06-03 RX ADMIN — OMEPRAZOLE 20 MG: 20 CAPSULE, DELAYED RELEASE ORAL at 20:30

## 2017-06-03 RX ADMIN — METHYLPREDNISOLONE SODIUM SUCCINATE 125 MG: 125 INJECTION, POWDER, FOR SOLUTION INTRAMUSCULAR; INTRAVENOUS at 18:16

## 2017-06-03 RX ADMIN — IMMUNE GLOBULIN 10 G: 50 SOLUTION INTRAVENOUS at 03:24

## 2017-06-03 RX ADMIN — STANDARDIZED SENNA CONCENTRATE AND DOCUSATE SODIUM 2 TABLET: 8.6; 5 TABLET, FILM COATED ORAL at 09:23

## 2017-06-03 RX ADMIN — METHYLPREDNISOLONE SODIUM SUCCINATE 125 MG: 125 INJECTION, POWDER, FOR SOLUTION INTRAMUSCULAR; INTRAVENOUS at 06:00

## 2017-06-03 RX ADMIN — IMMUNE GLOBULIN 10 G: 50 SOLUTION INTRAVENOUS at 04:20

## 2017-06-03 RX ADMIN — OMEPRAZOLE 20 MG: 20 CAPSULE, DELAYED RELEASE ORAL at 09:23

## 2017-06-03 RX ADMIN — ACETAMINOPHEN 650 MG: 325 TABLET, FILM COATED ORAL at 22:37

## 2017-06-03 RX ADMIN — IMMUNE GLOBULIN 10 G: 50 SOLUTION INTRAVENOUS at 02:30

## 2017-06-03 RX ADMIN — IMMUNE GLOBULIN 10 G: 50 SOLUTION INTRAVENOUS at 00:25

## 2017-06-03 ASSESSMENT — ENCOUNTER SYMPTOMS
PALPITATIONS: 0
SENSORY CHANGE: 0
TINGLING: 0
CONSTIPATION: 0
FEVER: 0
HEADACHES: 0
ORTHOPNEA: 0
EYE PAIN: 0
BRUISES/BLEEDS EASILY: 1
TREMORS: 0
SPEECH CHANGE: 0
CHILLS: 0
GASTROINTESTINAL NEGATIVE: 1
BLURRED VISION: 0
PND: 0
BLOOD IN STOOL: 0
MYALGIAS: 0
HEMOPTYSIS: 0
CLAUDICATION: 0
NAUSEA: 0
MUSCULOSKELETAL NEGATIVE: 1
CARDIOVASCULAR NEGATIVE: 1
NECK PAIN: 0
DIZZINESS: 0
RESPIRATORY NEGATIVE: 1
PSYCHIATRIC NEGATIVE: 1
DOUBLE VISION: 0
MEMORY LOSS: 0
STRIDOR: 0
SPUTUM PRODUCTION: 0
PHOTOPHOBIA: 0
DEPRESSION: 0
BACK PAIN: 0
NEUROLOGICAL NEGATIVE: 1
WEAKNESS: 0
HEARTBURN: 0
NERVOUS/ANXIOUS: 0
COUGH: 0
SHORTNESS OF BREATH: 0
VOMITING: 0
SORE THROAT: 0

## 2017-06-03 ASSESSMENT — LIFESTYLE VARIABLES
HAVE YOU EVER FELT YOU SHOULD CUT DOWN ON YOUR DRINKING: NO
TOTAL SCORE: 0
TOTAL SCORE: 0
HOW MANY TIMES IN THE PAST YEAR HAVE YOU HAD 5 OR MORE DRINKS IN A DAY: 0
AVERAGE NUMBER OF DAYS PER WEEK YOU HAVE A DRINK CONTAINING ALCOHOL: 2
CONSUMPTION TOTAL: NEGATIVE
ALCOHOL_USE: YES
EVER HAD A DRINK FIRST THING IN THE MORNING TO STEADY YOUR NERVES TO GET RID OF A HANGOVER: NO
HAVE PEOPLE ANNOYED YOU BY CRITICIZING YOUR DRINKING: NO
ON A TYPICAL DAY WHEN YOU DRINK ALCOHOL HOW MANY DRINKS DO YOU HAVE: 1
TOTAL SCORE: 0
EVER FELT BAD OR GUILTY ABOUT YOUR DRINKING: NO
EVER_SMOKED: NEVER

## 2017-06-03 ASSESSMENT — PAIN SCALES - GENERAL
PAINLEVEL_OUTOF10: 0
PAINLEVEL_OUTOF10: 4
PAINLEVEL_OUTOF10: 0
PAINLEVEL_OUTOF10: 0

## 2017-06-03 NOTE — PROGRESS NOTES
Radha from Lab called with critical result of Platelets: 2 at 0102. Critical lab result read back to Radha.   This critical lab result is within parameters established by  for this patient

## 2017-06-03 NOTE — PROGRESS NOTES
[facility] Hospitalist Progress Note    Date of Service: 6/3/2017    Chief Complaint  56 y.o. female admitted 2017 with no significant past medication history admitted with petechiae on her extremities which is consistent with thrombocytopenia, with noted platelets of 1.     Interval Problem Update  No acute issues overall, patient denies having any complaints, denies having headaches, vision changes, chest pain, shortness of breath or nausea/vommiting  IVIG & high dose solumedrol started and given.  Check CBC now.    Consultants/Specialty  Hematology    Disposition  TBD        Review of Systems   Constitutional: Negative for fever, chills and malaise/fatigue.   HENT: Negative for congestion, hearing loss, sore throat and tinnitus.    Eyes: Negative for blurred vision, double vision, photophobia and pain.   Respiratory: Negative for cough, hemoptysis, sputum production, shortness of breath and stridor.    Cardiovascular: Negative for chest pain, palpitations, orthopnea, claudication and PND.   Gastrointestinal: Negative for heartburn, nausea, vomiting, constipation, blood in stool and melena.   Genitourinary: Negative for dysuria, urgency and frequency.   Musculoskeletal: Negative for myalgias, back pain and neck pain.   Skin: Positive for rash.   Neurological: Negative for dizziness, tingling, tremors, sensory change, speech change, weakness and headaches.   Psychiatric/Behavioral: Negative for depression, suicidal ideas and memory loss. The patient is not nervous/anxious.       Physical Exam  Laboratory/Imaging   Hemodynamics  Temp (24hrs), Av.9 °C (98.5 °F), Min:36.7 °C (98 °F), Max:37.3 °C (99.1 °F)   Temperature: 36.9 °C (98.5 °F)  Pulse  Av  Min: 71  Max: 87    Blood Pressure: 108/64 mmHg      Respiratory      Respiration: 18, Pulse Oximetry: 100 %             Fluids    Intake/Output Summary (Last 24 hours) at 17 0797  Last data filed at 17 0100   Gross per 24 hour   Intake    640 ml    Output      0 ml   Net    640 ml       Nutrition  Orders Placed This Encounter   Procedures   • Diet Order     Standing Status: Standing      Number of Occurrences: 1      Standing Expiration Date:      Order Specific Question:  Diet:     Answer:  Regular [1]     Physical Exam   Constitutional: She is oriented to person, place, and time. She appears well-developed and well-nourished.   HENT:   Head: Normocephalic and atraumatic.   Mouth/Throat: No oropharyngeal exudate.   Eyes: Conjunctivae are normal. Pupils are equal, round, and reactive to light. Right eye exhibits no discharge. No scleral icterus.   Neck: Neck supple. No JVD present. No thyromegaly present.   Cardiovascular: Intact distal pulses.    No murmur heard.  Pulmonary/Chest: Effort normal and breath sounds normal. No stridor. No respiratory distress. She has no wheezes. She has no rales.   Abdominal: Soft. Bowel sounds are normal. She exhibits no distension. There is no tenderness. There is no rebound.   Musculoskeletal: Normal range of motion. She exhibits no edema.   Neurological: She is alert and oriented to person, place, and time.   Skin: Skin is warm. No erythema.   Lower extermity petechai    Psychiatric: She has a normal mood and affect. Her behavior is normal. Thought content normal.       Recent Labs      06/02/17   1523  06/03/17   0010   WBC  6.3  2.9*   RBC  5.49*  4.99   HEMOGLOBIN  15.6  14.3   HEMATOCRIT  47.5*  43.4   MCV  86.5  87.0   MCH  28.4  28.7   MCHC  32.8*  32.9*   RDW  39.2  38.5   PLATELETCT  1*  2*   MPV   --   14.3*     Recent Labs      06/02/17   1523  06/03/17   0010   SODIUM  139  135   POTASSIUM  3.7  3.6   CHLORIDE  104  103   CO2  27  24   GLUCOSE  98  119*   BUN  17  19   CREATININE  0.87  0.94   CALCIUM  10.0  9.2     Recent Labs      06/02/17   1523   APTT  26.0   INR  0.92                  Assessment/Plan     * Acute ITP (CMS-HCC) (present on admission)  Assessment & Plan  Admit platelets of 1, at this point   was consulted, patient started on IV solumedrol, IVIG, platelets are responding to 12 currently.  Check cbc in the am.  Transfuse if PLT<10    Thrombocytopenia (CMS-HCC) (present on admission)  Assessment & Plan  As result of ITP. Continue above.       Patient plan of care discussed at multidisplinary team rounds and with patient and R.N at beside.    Core Measures

## 2017-06-03 NOTE — ASSESSMENT & PLAN NOTE
Admit platelets of 1, at this point  was consulted, patient started on IV solumedrol, IVIG, platelets are responding to 12 currently.  Check cbc in the am.  Transfuse if PLT<10

## 2017-06-03 NOTE — CONSULTS
DATE OF SERVICE:  06/02/2017    CHIEF COMPLAINT:  Idiopathic thrombocytopenic purpura.    HISTORY OF PRESENT ILLNESS:  The patient is a pleasant 56-year-old female from   [facility].  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  Beginning earlier this week on Tuesday, the   patient noted petechiae on her lower extremities.  She went to a [facility]   facility on Wednesday.  Her blood was not drawn, but she was sent to [facility].    She tells me that she was called by [facility] today and told that she had a   dangerously low platelet count.  She also talked to 1 nurse at [facility].    She comes in to the emergency room and indeed has a platelet count of 1.  Her   white count was normal at 6.3, hemoglobin is normal at 15.6.  Differential   was normal.  My partner, Dr. Landis has looked at the patient's smearing, he   tells me she has no shift to sides.  The patient's coagulation proteins are   normal.  LDH is normal at 134.  I do not have a bilirubin.  She does not   appear to be hemolyzing.  I came in and talked to the patient because she has   an aversion to getting steroids.  She apparently was diagnosed back in 2001 as   having multiple sclerosis.  She saw neurology.  She was given steroids for an   extended period of time.  She did not like the side effects, she did not like   being cushingoid.  She has not followed up with the diagnosis of multiple   sclerosis.  She does not think that she has it and maybe she does not.  The   patient has no bleeding from her mouth.  She really has not had any   nosebleeds.  She has had no GI bleeding.    PAST MEDICAL HISTORY:  Is pertinent for no surgeries.  She has no past medical   history except for the questionable history of multiple sclerosis back in   2001, which really did not pan out.  She takes no prescription drugs, but does   frequently take ibuprofen 600 mg a day for back pain when she is working.    Risk factors,  she does not smoke.  She occasionally drinks alcohol.    SOCIAL HISTORY:  She is not .  She does have one child.    FAMILY HISTORY:  Noncontributory.    ALLERGIES:  She has no known allergies.    REVIEW OF SYSTEMS:  She had no fevers, no chills.  She has had no shortness of   breath.  She has had no adenopathy.  She has had no chest pain, no abdominal   pain.  She has had no headaches.  She has had no visual problems.    PHYSICAL EXAMINATION:  VITAL SIGNS:  The patient's blood pressure is 147/76, pulse 88, respirations   20, temperature 36.8.  Oximetry is at 100%.  GENERAL:  Patient is a pleasant female in no acute distress.  I really do not   see any areas of bleeding in her mouth.  I do not see any epistaxis.  She does   have lower extremity petechiae.  She has no adenopathy.  LUNGS:  Clear.  CARDIAC:  Benign.  She has no breast mass.  ABDOMEN:  Soft and nontender.  She does not have a big spleen.  EXTREMITIES:  Normal except for the petechiae.  She has no swelling.  NEUROLOGIC:  She has no neurologic impairment.    IMPRESSION:  I told the patient that she absolutely needs to consider   steroids.  I told her that it is generally the frontline treatment for   idiopathic thrombocytopenic purpura.  I told her that she has a dangerously   low platelet count, I do not think we need to give her steroids, but we need   to get her platelet count up as quickly as possible.  I am going to give her   omeprazole 20 mg b.i.d., we are going to give her 125 mg of IV   methylprednisolone, we will give that q. 12 hours.  We will try to squeeze it   in, in the middle of her IVIG.  The patient does agree to take the steroids   after I explained to her the gravity of her situation.       ____________________________________     F MD MONIQUE WILLAMS / BILLY    DD:  06/02/2017 22:48:22  DT:  06/02/2017 23:05:34    D#:  [id number]  Job#:  [id number]

## 2017-06-03 NOTE — PROGRESS NOTES
"Patient arrived to unit at 1930 with transport via wheelchair. Did not receive report due to ER change of shift. Patient oriented to room, medications provided per MAR, patient reports no pain at this time, A/O x4.    Discussed patient care with Dr. Coronado, patient is refusing corticosteroids at this time due to a previous reaction about two years ago in [place]. Patient reports that steroids make her \"feel worse\", in the past. Dr. Coronado notified. Patient is currently receiving IVIG per MAR.    Patient up with standby assist due to low platelet count. PIV assessed, patent with no s/s of infiltration or infection noted.    Petechiae noted to the lower extremities, no other wounds or s/s of bleeding noted.     Plan of care discussed, questions answered. Bed is in the lowest position and locked, call light within reach, non-skid socks in place, hourly rounding. Patient reports no further needs and this time.      "

## 2017-06-03 NOTE — CARE PLAN
"Problem: Venous Thromboembolism (VTW)/Deep Vein Thrombosis (DVT) Prevention:  Goal: Patient will participate in Venous Thrombosis (VTE)/Deep Vein Thrombosis (DVT)Prevention Measures  Outcome: PROGRESSING AS EXPECTED  Pt ambulating in room and to bathroom with sba; no pharmacologic prophylaxis as pt with thrombocytopenia admission    Problem: Psychosocial Needs:  Goal: Level of anxiety will decrease  Intervention: Identify and develop with patient strategies to cope with anxiety triggers  Plan of care updated and s&s to report to nursing reviewed. Pt c/o HA throughout the night, but stating now resolved, \"probably from being hungry\". MD aware          "

## 2017-06-03 NOTE — PROGRESS NOTES
Assumed pt care at change of shift. Labs and orders noted. Pt aa&o x4, up c sba, steady gait. Assessment complete. No s&s of bleeding noted; petechiae on BLE and feet; per pt improved from previous. Plan of care reviewed with MD at bedside.Questions answered. Call light and belongings within reach. Continuing hourly rounding and assessing fro additional needs

## 2017-06-03 NOTE — PROGRESS NOTES
Tania from Lab called with critical result of Platelets 12 at 8:59. Critical lab result read back to Tania.   Dr. Coronado notified of critical lab result at 9:00.  Critical lab result read back by Dr. Coronado.

## 2017-06-03 NOTE — PROGRESS NOTES
Oncology/Hematology Progress Note               Author: MADDISON White Ivonne Date & Time created: 6/3/2017  9:12 AM     Interval History:  CC: ITP  Remains stable; no bleeding; told that plts up to 12k this AM  Will not repeat IVIG but continue Solumedrol IV  If plts > 20k  Tomorrow; switch to oral steroids and possible discharge  Hopefully will get out this weekend    Review of Systems:  Review of Systems   Constitutional: Negative for fever and chills.   HENT: Negative.    Respiratory: Negative.    Cardiovascular: Negative.    Gastrointestinal: Negative.    Musculoskeletal: Negative.    Skin: Negative for rash.   Neurological: Negative.    Endo/Heme/Allergies: Bruises/bleeds easily.   Psychiatric/Behavioral: Negative.        Physical Exam:  Physical Exam   Constitutional: No distress.   HENT:   Mouth/Throat: No oropharyngeal exudate.   Eyes: Pupils are equal, round, and reactive to light. No scleral icterus.   Neck: Normal range of motion.   Cardiovascular: Normal rate.    Pulmonary/Chest: Effort normal.   Abdominal: Soft. Bowel sounds are normal.   Musculoskeletal: Normal range of motion.   Lymphadenopathy:     She has no cervical adenopathy.   Neurological: She is alert.   Skin: Skin is warm.       Labs:        Invalid input(s): [id number]      Recent Labs      06/02/17   1523  06/03/17   0010  06/03/17   0826   SODIUM  139  135  138   POTASSIUM  3.7  3.6  3.6   CHLORIDE  104  103  106   CO2  27  24  25   BUN  17  19  14   CREATININE  0.87  0.94  0.94   MAGNESIUM   --    --   1.8   CALCIUM  10.0  9.2  9.5     Recent Labs      06/02/17   1523  06/03/17   0010  06/03/17   0826   ALTSGPT   --   9  11   ASTSGOT   --   11*  12   ALKPHOSPHAT   --   64  70   TBILIRUBIN   --   0.7  0.7   GLUCOSE  98  119*  117*     Recent Labs      06/02/17   1523  06/03/17   0010  06/03/17   0826   RBC  5.49*  4.99  5.17   HEMOGLOBIN  15.6  14.3  14.7   HEMATOCRIT  47.5*  43.4  44.3   PLATELETCT  1*  2*  12*   PROTHROMBTM  12.6   --     --    APTT  26.0   --    --    INR  0.92   --    --      Recent Labs      17   1523  17   0010  17   0826   WBC  6.3  2.9*  4.4*   NEUTSPOLYS  77.00*  56.40  86.40*   LYMPHOCYTES  13.50*  28.70  8.60*   MONOCYTES  7.60  11.10  3.40   EOSINOPHILS  1.00  2.80  0.70   BASOPHILS  0.60  0.70  0.70   ASTSGOT   --   11*  12   ALTSGPT   --   9  11   ALKPHOSPHAT   --   64  70   TBILIRUBIN   --   0.7  0.7     Recent Labs      17   1523  17   0010  17   0826   SODIUM  139  135  138   POTASSIUM  3.7  3.6  3.6   CHLORIDE  104  103  106   CO2  27  24  25   GLUCOSE  98  119*  117*   BUN  17  19  14   CREATININE  0.87  0.94  0.94   CALCIUM  10.0  9.2  9.5     Hemodynamics:  Temp (24hrs), Av.9 °C (98.4 °F), Min:36.7 °C (98 °F), Max:37.3 °C (99.1 °F)  Temperature: 36.8 °C (98.2 °F)  Pulse  Av.4  Min: 71  Max: 87   Blood Pressure: 123/59 mmHg     Respiratory:    Respiration: 17, Pulse Oximetry: 95 %           Fluids:    Intake/Output Summary (Last 24 hours) at 17 0912  Last data filed at 17 0100   Gross per 24 hour   Intake    640 ml   Output      0 ml   Net    640 ml     Weight: 68.5 kg (151 lb 0.2 oz)  GI/Nutrition:  Orders Placed This Encounter   Procedures   • Diet Order     Standing Status: Standing      Number of Occurrences: 1      Standing Expiration Date:      Order Specific Question:  Diet:     Answer:  Regular [1]     Medical Decision Making, by Problem:  Active Hospital Problems    Diagnosis   • *Acute ITP (CMS-HCC) [D69.3]   • Thrombocytopenia (CMS-HCC) [D69.6]       Plan:  ITP: CMP benign; plts rising  Hopefully will consent to outpt. steroids    Labs reviewed

## 2017-06-03 NOTE — H&P
DATE OF ADMISSION:  06/02/2017    PRIMARY CARE PHYSICIAN:  None.    CHIEF COMPLAINT:  Petechiae on the extremities.    HISTORY OF PRESENT ILLNESS:  A 56-year-old female.  She has no significant   past medical history.  Over the past week, the patient has noted a new   problem.  She has developed a rash over her extremities and trunk.  It is   worse in her legs.  She has tiny, red lesions covering her body.  Patient has   had some mild epistaxis, which stopped spontaneously.  She also endorses some   sores in her mouth.  It actually got better in the past couple of days and she   now only has few small sores on her lower lip.  She has no headache, no signs   of GI bleeding.  She is not taking any new medications.  She apparently was   scratched recently by her rabbit.  She has no known history of autoimmune   disease or ITP to her knowledge.    REVIEW OF SYSTEMS:  A comprehensive review of systems was performed.  All   pertinent positives and negatives are described in the HPI, all other systems   reviewed and are negative.    PAST MEDICAL HISTORY:  None.    SOCIAL HISTORY:  She is a lifetime nonsmoker.  She drinks alcohol   occasionally.    FAMILY HISTORY:  Both her mother and father lived into their 70s.  She does   not know their medical history.    ALLERGIES:  No known drug allergies.    MEDICATIONS:  None.    PHYSICAL EXAMINATION:  VITAL SIGNS:  Temperature 37.3, blood pressure 160/84, pulse 87, respirations   16, saturating 100% on room air.  GENERAL APPEARANCE:  Well-developed, well-nourished, no apparent distress.  HEENT:  Eyes, pupils equal, round and reactive, anicteric sclerae, moist   conjunctivae with no lid leg.  HEENT:  Normocephalic, atraumatic.  Mucous membranes are moist.  She has some   small 2 mm size ulcerations at her lower lip.  ABDOMEN:  Soft, nontender, no masses, no hepatosplenomegaly.  EXTREMITIES:  No clubbing, no cyanosis, no edema.  Normal capillary refill.  SKIN:  Patient's skin was  covered with petechiae, most prominent on her lower   legs.    LABORATORY DATA:  White blood cell count 6.3, hemoglobin 15.6, platelets   1.  Sodium 139, potassium 3.7, BUN 17, creatinine 0.87.    ASSESSMENT AND PLAN:  A 56-year-old female who presents with profound   thrombocytopenia.  I suspect this is a result of ITP.   1.  Thrombocytopenia.  I have spoken with Dr. Eber Landis of hematology.  We   agreed to treat the patient with IVIG for presumed thrombocytopenia.  IVIG has   been ordered.  Hold off on steroids for today as Dr. Landis is worried about   the possibility of gastrointestinal bleed.  Steroids are likely to be   recommended tomorrow and this was explained to the patient and for some   reasons, she is saying she is going to refuse this.  Dr. Landis and I also   agreed to hold off on platelet transfusion unless the patient develops overt   bleeding or signs of headache.  2.  Prophylaxis, bowel regimen.  No chemoprophylaxis for deep venous   thrombosis due to profound thrombocytopenia.    Case discussed with the emergency room physician, Dr. Johnson as well as   hematology/oncology, Dr. Landis.  The patient is expected to remain in the   hospital for greater than 2 midnights.       ____________________________________     MD LUZMARIA RIVAS / BILLY    DD:  06/02/2017 20:42:56  DT:  06/02/2017 21:01:05    D#:  [id number]  Job#:  [id number]

## 2017-06-04 ENCOUNTER — APPOINTMENT (OUTPATIENT)
Dept: RADIOLOGY | Facility: MEDICAL CENTER | Age: 57
DRG: 813 | End: 2017-06-04
Attending: HOSPITALIST
Payer: COMMERCIAL

## 2017-06-04 VITALS
HEART RATE: 101 BPM | BODY MASS INDEX: 23.53 KG/M2 | WEIGHT: 146.39 LBS | OXYGEN SATURATION: 95 % | TEMPERATURE: 98.1 F | SYSTOLIC BLOOD PRESSURE: 117 MMHG | DIASTOLIC BLOOD PRESSURE: 66 MMHG | RESPIRATION RATE: 16 BRPM | HEIGHT: 66 IN

## 2017-06-04 PROBLEM — D69.3 ACUTE ITP (HCC): Status: RESOLVED | Noted: 2017-06-02 | Resolved: 2017-06-04

## 2017-06-04 LAB
ALBUMIN SERPL BCP-MCNC: 3.8 G/DL (ref 3.2–4.9)
ALBUMIN/GLOB SERPL: 0.8 G/DL
ALP SERPL-CCNC: 62 U/L (ref 30–99)
ALT SERPL-CCNC: 7 U/L (ref 2–50)
ANION GAP SERPL CALC-SCNC: 7 MMOL/L (ref 0–11.9)
AST SERPL-CCNC: 10 U/L (ref 12–45)
BASOPHILS # BLD AUTO: 0.3 % (ref 0–1.8)
BASOPHILS # BLD: 0.03 K/UL (ref 0–0.12)
BILIRUB SERPL-MCNC: 0.6 MG/DL (ref 0.1–1.5)
BUN SERPL-MCNC: 17 MG/DL (ref 8–22)
CALCIUM SERPL-MCNC: 9.3 MG/DL (ref 8.5–10.5)
CHLORIDE SERPL-SCNC: 108 MMOL/L (ref 96–112)
CO2 SERPL-SCNC: 23 MMOL/L (ref 20–33)
CREAT SERPL-MCNC: 0.61 MG/DL (ref 0.5–1.4)
EOSINOPHIL # BLD AUTO: 0 K/UL (ref 0–0.51)
EOSINOPHIL NFR BLD: 0 % (ref 0–6.9)
ERYTHROCYTE [DISTWIDTH] IN BLOOD BY AUTOMATED COUNT: 38.9 FL (ref 35.9–50)
GFR SERPL CREATININE-BSD FRML MDRD: >60 ML/MIN/1.73 M 2
GLOBULIN SER CALC-MCNC: 4.5 G/DL (ref 1.9–3.5)
GLUCOSE SERPL-MCNC: 139 MG/DL (ref 65–99)
HCT VFR BLD AUTO: 44.4 % (ref 37–47)
HGB BLD-MCNC: 14.9 G/DL (ref 12–16)
IMM GRANULOCYTES # BLD AUTO: 0.03 K/UL (ref 0–0.11)
IMM GRANULOCYTES NFR BLD AUTO: 0.3 % (ref 0–0.9)
LYMPHOCYTES # BLD AUTO: 0.57 K/UL (ref 1–4.8)
LYMPHOCYTES NFR BLD: 6.4 % (ref 22–41)
MCH RBC QN AUTO: 28.9 PG (ref 27–33)
MCHC RBC AUTO-ENTMCNC: 33.6 G/DL (ref 33.6–35)
MCV RBC AUTO: 86 FL (ref 81.4–97.8)
MONOCYTES # BLD AUTO: 0.25 K/UL (ref 0–0.85)
MONOCYTES NFR BLD AUTO: 2.8 % (ref 0–13.4)
NEUTROPHILS # BLD AUTO: 8.04 K/UL (ref 2–7.15)
NEUTROPHILS NFR BLD: 90.2 % (ref 44–72)
NRBC # BLD AUTO: 0 K/UL
NRBC BLD AUTO-RTO: 0 /100 WBC
PLATELET # BLD AUTO: 64 K/UL (ref 164–446)
PMV BLD AUTO: 11.6 FL (ref 9–12.9)
POTASSIUM SERPL-SCNC: 3.7 MMOL/L (ref 3.6–5.5)
PROT SERPL-MCNC: 8.3 G/DL (ref 6–8.2)
RBC # BLD AUTO: 5.16 M/UL (ref 4.2–5.4)
SODIUM SERPL-SCNC: 138 MMOL/L (ref 135–145)
WBC # BLD AUTO: 8.9 K/UL (ref 4.8–10.8)

## 2017-06-04 PROCEDURE — 70450 CT HEAD/BRAIN W/O DYE: CPT

## 2017-06-04 PROCEDURE — 700111 HCHG RX REV CODE 636 W/ 250 OVERRIDE (IP): Performed by: SPECIALIST

## 2017-06-04 PROCEDURE — A9270 NON-COVERED ITEM OR SERVICE: HCPCS | Performed by: HOSPITALIST

## 2017-06-04 PROCEDURE — 700102 HCHG RX REV CODE 250 W/ 637 OVERRIDE(OP): Performed by: HOSPITALIST

## 2017-06-04 PROCEDURE — 99239 HOSP IP/OBS DSCHRG MGMT >30: CPT | Performed by: HOSPITALIST

## 2017-06-04 PROCEDURE — 85025 COMPLETE CBC W/AUTO DIFF WBC: CPT

## 2017-06-04 PROCEDURE — 80053 COMPREHEN METABOLIC PANEL: CPT

## 2017-06-04 PROCEDURE — 36415 COLL VENOUS BLD VENIPUNCTURE: CPT

## 2017-06-04 RX ORDER — DEXAMETHASONE 4 MG/1
40 TABLET ORAL 2 TIMES DAILY
Qty: 40 TAB | Refills: 0 | Status: SHIPPED | OUTPATIENT
Start: 2017-06-04 | End: 2017-06-28

## 2017-06-04 RX ORDER — DEXAMETHASONE 4 MG/1
40 TABLET ORAL 2 TIMES DAILY
Qty: 40 TAB | Refills: 0 | Status: SHIPPED | OUTPATIENT
Start: 2017-06-04 | End: 2017-06-04

## 2017-06-04 RX ORDER — OMEPRAZOLE 20 MG/1
20 CAPSULE, DELAYED RELEASE ORAL EVERY 12 HOURS
Qty: 30 CAP | Refills: 1 | Status: SHIPPED | OUTPATIENT
Start: 2017-06-04 | End: 2017-06-28

## 2017-06-04 RX ADMIN — ACETAMINOPHEN 650 MG: 325 TABLET, FILM COATED ORAL at 13:53

## 2017-06-04 RX ADMIN — METHYLPREDNISOLONE SODIUM SUCCINATE 125 MG: 125 INJECTION, POWDER, FOR SOLUTION INTRAMUSCULAR; INTRAVENOUS at 05:13

## 2017-06-04 RX ADMIN — STANDARDIZED SENNA CONCENTRATE AND DOCUSATE SODIUM 2 TABLET: 8.6; 5 TABLET, FILM COATED ORAL at 10:14

## 2017-06-04 ASSESSMENT — PAIN SCALES - GENERAL: PAINLEVEL_OUTOF10: 1

## 2017-06-04 ASSESSMENT — ENCOUNTER SYMPTOMS
NERVOUS/ANXIOUS: 1
RESPIRATORY NEGATIVE: 1
ABDOMINAL PAIN: 0
CARDIOVASCULAR NEGATIVE: 1
MUSCULOSKELETAL NEGATIVE: 1
FEVER: 0
BRUISES/BLEEDS EASILY: 0
CHILLS: 0

## 2017-06-04 NOTE — DISCHARGE INSTRUCTIONS
Discharge Instructions    Discharged to home by car with self. Discharged via wheelchair, hospital escort: Yes.  Special equipment needed: Not Applicable    Be sure to schedule a follow-up appointment with your primary care doctor or any specialists as instructed.     Discharge Plan:   Diet Plan: Discussed  Activity Level: Discussed  Confirmed Follow up Appointment: Patient to Call and Schedule Appointment  Confirmed Symptoms Management: Discussed  Medication Reconciliation Updated: Yes  Influenza Vaccine Indication: Patient Refuses    I understand that a diet low in cholesterol, fat, and sodium is recommended for good health. Unless I have been given specific instructions below for another diet, I accept this instruction as my diet prescription.   Other diet: regular as tolerated    Special Instructions:     See Dr Coronado in office in 7-10 days    Lab work late next week (Thursday or Friday as discussed)    High dose Decadron for four days; asked patient to consider splenectomy if recurrence    · Is patient discharged on Warfarin / Coumadin?   No     · Is patient Post Blood Transfusion?  No      Idiopathic Thrombocytopenic Purpura  Idiopathic thrombocytopenic purpura (ITP) is a disease in which your body's defense system (immune system) attacks your platelets. Platelets are blood cells that help form clots and seal leaks in damaged blood vessels. With ITP, you to have too few platelets. As a result, you bleed more easily. It is also harder for your body to stop any bleeding.  In adults, ITP is usually a long-term disease.  CAUSES  The cause is unknown. ITP may develop after a viral infection, during pregnancy, or from an immune disorder.  RISK FACTORS  The risk of ITP may be greater among:  Women.  Adults 20-50 years old.  SIGNS AND SYMPTOMS  Common signs and symptoms include:  Easy bruising.  A cut that bleeds for a long time.  Tiny purple blood dots (petechiae) under the skin, especially on the shins.  Blood in  urine or bowel movements.  Nosebleeds.  Bleeding gums.  Heavy menstrual periods.  Mild forms of ITP may not cause any symptoms.  DIAGNOSIS  Your health care provider may suspect ITP based on your signs and symptoms. To make a diagnosis, your health care provider may do a physical exam and order blood tests to:  Find how many platelets you have.  See how well your blood clots.  Your health care provider may then do blood tests or a bone marrow test to rule out other conditions that could be causing your symptoms.  TREATMENT  Treatment depends on the severity of your condition. Options include:  Monitoring of your condition and platelet count.  Blood transfusions of antibodies or platelets.  Medicines such as:  Strong anti-inflammatory medicines (steroids).  Medicines to boost platelet production.  Medicines to suppress your immune system.  Removal of your spleen. This may be done if other treatments do not work.  HOME CARE INSTRUCTIONS  Take medicines only as directed by your health care provider.  Do not take over-the-counter medicines that lower your platelet count, affect platelet function, or affect your blood's ability to clot. These include aspirin and ibuprofen.  Do not participate in contact sports or other high-risk activities. Ask your health care provider which activities are safe for you.  Keep all follow-up visits as directed by your health care provider. This is important.  SEEK MEDICAL CARE IF:  You have new symptoms.  Your symptoms get worse.  SEEK IMMEDIATE MEDICAL CARE IF:  You have a sudden severe headache or confusion.  You have significant bleeding.  You have nausea and vomiting.     This information is not intended to replace advice given to you by your health care provider. Make sure you discuss any questions you have with your health care provider.     Document Released: 07/15/2015 Document Reviewed: 07/15/2015  [employer] Interactive Patient Education ©2016 [employer]      Depression /  Suicide Risk    As you are discharged from this [facility] facility, it is important to learn how to keep safe from harming yourself.    Recognize the warning signs:  · Abrupt changes in personality, positive or negative- including increase in energy   · Giving away possessions  · Change in eating patterns- significant weight changes-  positive or negative  · Change in sleeping patterns- unable to sleep or sleeping all the time   · Unwillingness or inability to communicate  · Depression  · Unusual sadness, discouragement and loneliness  · Talk of wanting to die  · Neglect of personal appearance   · Rebelliousness- reckless behavior  · Withdrawal from people/activities they love  · Confusion- inability to concentrate     If you or a loved one observes any of these behaviors or has concerns about self-harm, here's what you can do:  · Talk about it- your feelings and reasons for harming yourself  · Remove any means that you might use to hurt yourself (examples: pills, rope, extension cords, firearm)  · Get professional help from the community (Mental Health, Substance Abuse, psychological counseling)  · Do not be alone:Call your Safe Contact- someone whom you trust who will be there for you.  · Call your local CRISIS HOTLINE 440-5562 or 981-397-3355  · Call your local Children's Mobile Crisis Response Team [place] (569) 781-9735 or www.[employer]  · Call the toll free National Suicide Prevention Hotlines   · National Suicide Prevention Lifeline 698-157-BTWI (7770)  · National Hope Line Network 800-SUICIDE (945-4802)

## 2017-06-04 NOTE — PROGRESS NOTES
Hospitalist paged and updated about patient headache 5/10, notified of ITP with platelets of 12. New orders received for Excedrin home medication restarted.

## 2017-06-04 NOTE — PROGRESS NOTES
Pt now ready for DC. DC instructions, follow up and prescriptions reviewed and all questions answered. PIV removed. Note for work provided as requested. All belongings with pt. Transport dispatched

## 2017-06-04 NOTE — PROGRESS NOTES
Received report and assumed patient care at change of shift. Patient is resting in bed, A/O x4. Patient reports 0/10 pain at this time, medications provided per MAR.    PIV assessed, patent with no s/s of infection or infiltration noted at this time.    Patient reports feeling warm, afebrile at this time. Ice pack provided for comfort, room temperature adjusted.    Continuing education provided about solu-medrol in treatment due to patient reluctance from past side effects. Will continue to provide education and answer all questions per nursing knowledge as needed.    Plan of care discussed, questions answered. Bed is in the lowest position and locked, call light within reach, non-skid socks in place, hourly rounding. Patient reports no further needs and this time.

## 2017-06-04 NOTE — DISCHARGE SUMMARY
Hospital Medicine Discharge Note     Admit Date:  6/2/2017       Discharge Date:   6/4/2017  LOS: 2 days     Primary Care Provider:    Brenda Davis PA-C    Attending Physician:  Annette Cavazos     Discharge Diagnoses:   Thrombocytopenia (CMS-[facility])- improving  Acute ITP    Consultants:      Hematology    Studies:    Imaging/ Testing:      CT-HEAD W/O   Final Result      Head CT without contrast within normal limits. No evidence of acute cerebral infarction, hemorrhage or mass lesion.            Procedures:        None    Hospital Summary (Brief Narrative):         In brief, Pily Anderson is a very pleasant 56 y.o. female who was admitted 6/2/2017 for petechiae on her lower extremities, with labs done on admission showing evidence of thrombocytopenia with platelets of 1. Hematology was urgently consulted, patient was started on high dose steroids, IVIG. Amazingly patient showed a very good response with platelets improving to 64 today after one day of treatment. Per hematology recommendation, we will pulse her with 4 days of 40mg of decadron and stop. She will follow up with  in 2 weeks. Labs will be provided as well to be obtained on Thursday of this week.Patient denies fevers/chills, chest pain, shortness of breath or nausea/vommiting.         For H and P on admission, please refer to full H and P dictated by Dr. Bernard SHAHID        Disposition:   Discharge home    Condition:  Stable    Activity:   As tolerated     Diet:   Heart Healthy Diet  Discharge Medications:           Medication List      START taking these medications       Instructions    dexamethasone 4 MG Tabs   Commonly known as:  DECADRON    Take 10 Tabs by mouth 2 times a day.   Dose:  40 mg       omeprazole 20 MG delayed-release capsule   Last time this was given:  20 mg on 6/3/2017  8:30 PM   Commonly known as:  PRILOSEC    Take 1 Cap by mouth every 12 hours.   Dose:  20 mg         STOP taking these medications           asa/apap/caffeine 250-250-65 MG Tabs   Commonly known as:  EXCEDRIN       ibuprofen 200 MG Tabs   Commonly known as:  MOTRIN               Follow up appointment details :      I encouraged her to call his PCP to confirm follow up after discharge.    Future Appointments  Date Time Provider Department Center   6/13/2017 1:40 PM ALYSHA Rubio         Instructions:      The were given instructions to return to the ER if patient's condition worsens      Time Spent on Discharge:     Discharge instructions were discussed with the patient at bedside. Patient  expressed understanding and agreed to comply with all discharge instructions.    38 minutes were spent in the discharge planning and management of this  patient, including more than 50% of the time spent face to face in   Counseling.

## 2017-06-04 NOTE — PROGRESS NOTES
"Assumed pt care at change of shift. Labs and orders noted. Pt aa&o x4, c/o facial flushing; overall flushed appearance noted. Pt also with noted c/o tachycardia and \"hungover headache\" throughout night. Stating mildly improved post Excedrin administration, but \"dull\" headache still present. MD notified and orders pending. Plan of care updated with MD at bedside. Questions addressed. Continuing hourly rounding and assessing for additional needs    "

## 2017-06-04 NOTE — PROGRESS NOTES
Pt back from CT, no c/o pain at this time, no neuro deficits noted. Pt eager for possible DC later today

## 2017-06-04 NOTE — CARE PLAN
Problem: Knowledge Deficit  Goal: Knowledge of disease process/condition, treatment plan, diagnostic tests, and medications will improve  Outcome: PROGRESSING SLOWER THAN EXPECTED  Patient educated about ITP, lack of knowledge about disease process and treatments noted. Education reinforced and questions answered.  Goal: Knowledge of the prescribed therapeutic regimen will improve  Outcome: PROGRESSING SLOWER THAN EXPECTED  Patient continually reluctant to pursue treatment of ITP with corticosteroids, patient educated about this treatment and questions answered. MD discussed this with patient, patient is willing to receive IV solu-medrol, however I anticipate possible compliance issues post discharge. Will continue to reinforce education and answer questions as needed per nursing knowledge.

## 2017-06-04 NOTE — PROGRESS NOTES
Dexamethasone prescription clarified with MD Cavazos and [address] pharmacy and pt to receive 40 mg daily for 4 days. Patient also called for update

## 2017-06-04 NOTE — PROGRESS NOTES
[facility] pharmacy called per pt request. Neither Tayla Pichardo nor [address] [place] [address] location confirm receipt of e- Rx MD notified as pt eager to leave

## 2017-06-04 NOTE — PROGRESS NOTES
Oncology/Hematology Progress Note               Author: MADDISON Coronado Date & Time created: 6/4/2017  10:16 AM     Interval History:  CC: ITP  Jump in plts to 64k  Will send home on Decadron 40 qd x4  Check head CT before discharge because of HA  CBC late next week; then see me in office    Review of Systems:  Review of Systems   Constitutional: Negative for fever and chills.   Respiratory: Negative.    Cardiovascular: Negative.    Gastrointestinal: Negative for abdominal pain.   Genitourinary: Negative.    Musculoskeletal: Negative.    Skin: Negative for rash.   Endo/Heme/Allergies: Does not bruise/bleed easily.   Psychiatric/Behavioral: The patient is nervous/anxious.        Physical Exam:  Physical Exam   Constitutional: She is oriented to person, place, and time. She appears distressed.   HENT:   Mouth/Throat: No oropharyngeal exudate.   Eyes: Pupils are equal, round, and reactive to light. No scleral icterus.   Neck: Normal range of motion.   Pulmonary/Chest: Effort normal.   Abdominal: Soft. Bowel sounds are normal.   Lymphadenopathy:     She has no cervical adenopathy.   Neurological: She is alert and oriented to person, place, and time.   Skin: Skin is warm. There is erythema.       Labs:        Invalid input(s): [id number]      Recent Labs      06/03/17   0010  06/03/17   0826  06/04/17   0823   SODIUM  135  138  138   POTASSIUM  3.6  3.6  3.7   CHLORIDE  103  106  108   CO2  24  25  23   BUN  19  14  17   CREATININE  0.94  0.94  0.61   MAGNESIUM   --   1.8   --    CALCIUM  9.2  9.5  9.3     Recent Labs      06/03/17   0010  06/03/17   0826  06/04/17   0823   ALTSGPT  9  11  7   ASTSGOT  11*  12  10*   ALKPHOSPHAT  64  70  62   TBILIRUBIN  0.7  0.7  0.6   GLUCOSE  119*  117*  139*     Recent Labs      06/02/17   1523  06/03/17   0010  06/03/17   0826  06/04/17   0823   RBC  5.49*  4.99  5.17  5.16   HEMOGLOBIN  15.6  14.3  14.7  14.9   HEMATOCRIT  47.5*  43.4  44.3  44.4   PLATELETCT  1*  2*  12*  64*    PROTHROMBTM  12.6   --    --    --    APTT  26.0   --    --    --    INR  0.92   --    --    --      Recent Labs      17   0010  17   0826  17   0823   WBC  2.9*  4.4*  8.9   NEUTSPOLYS  56.40  86.40*  90.20*   LYMPHOCYTES  28.70  8.60*  6.40*   MONOCYTES  11.10  3.40  2.80   EOSINOPHILS  2.80  0.70  0.00   BASOPHILS  0.70  0.70  0.30   ASTSGOT  11*  12  10*   ALTSGPT  9  11  7   ALKPHOSPHAT  64  70  62   TBILIRUBIN  0.7  0.7  0.6     Recent Labs      17   0010  17   0826  17   0823   SODIUM  135  138  138   POTASSIUM  3.6  3.6  3.7   CHLORIDE  103  106  108   CO2  24  25  23   GLUCOSE  119*  117*  139*   BUN  19  14  17   CREATININE  0.94  0.94  0.61   CALCIUM  9.2  9.5  9.3     Hemodynamics:  Temp (24hrs), Av.9 °C (98.4 °F), Min:36.7 °C (98.1 °F), Max:36.9 °C (98.5 °F)  Temperature: 36.7 °C (98.1 °F)  Pulse  Av.8  Min: 71  Max: 112   Blood Pressure: 117/66 mmHg     Respiratory:    Respiration: 16, Pulse Oximetry: 95 %           Fluids:    Intake/Output Summary (Last 24 hours) at 17 1016  Last data filed at 17   Gross per 24 hour   Intake   2039 ml   Output      0 ml   Net   2039 ml        GI/Nutrition:  Orders Placed This Encounter   Procedures   • Diet Order     Standing Status: Standing      Number of Occurrences: 1      Standing Expiration Date:      Order Specific Question:  Diet:     Answer:  Regular [1]     Medical Decision Making, by Problem:  Active Hospital Problems    Diagnosis   • *Acute ITP (CMS-HCC) [D69.3]   • Thrombocytopenia (CMS-HCC) [D69.6]       Plan:  ITP: rapid improvement in ITP  Will see in office in 7-10 days  High dose Decadron for four days; asked patient to consider splenectomy if recurrence    Labs reviewed

## 2017-06-04 NOTE — CARE PLAN
Problem: Venous Thromboembolism (VTW)/Deep Vein Thrombosis (DVT) Prevention:  Goal: Patient will participate in Venous Thrombosis (VTE)/Deep Vein Thrombosis (DVT)Prevention Measures  Intervention: Encourage patient to perform ankle flex, foot rotation, and knee flex exercises in addition to other prophylatic measures every hour while awake  Pt ambulating in room and to bathroom with steady gait; No pharmacologic anticoagulation as pt admitted with ITP diagnosis      Problem: Knowledge Deficit  Goal: Knowledge of disease process/condition, treatment plan, diagnostic tests, and medications will improve  Outcome: PROGRESSING AS EXPECTED  Disease process reviewed- pt demonstrates adequate understanding    Problem: Discharge Barriers/Planning  Goal: Patient’s continuum of care needs will be met  Intervention: Involve patient and significant other/support system in setting and prioritizing goals for hospital stay and discharge  DC instructions, follow up, prescriptions reviewed with pt. All questions answered

## 2017-06-06 LAB
ALBUMIN SERPL-MCNC: 4.8 G/DL (ref 3.5–5.5)
ALBUMIN/GLOB SERPL: 1.8 {RATIO} (ref 1.2–2.2)
ALP SERPL-CCNC: 85 IU/L (ref 39–117)
ALT SERPL-CCNC: 14 IU/L (ref 0–32)
AST SERPL-CCNC: 16 IU/L (ref 0–40)
BASOPHILS # BLD AUTO: 0 X10E3/UL (ref 0–0.2)
BASOPHILS NFR BLD AUTO: 0 %
BILIRUB SERPL-MCNC: 0.5 MG/DL (ref 0–1.2)
BUN SERPL-MCNC: 20 MG/DL (ref 6–24)
BUN/CREAT SERPL: 24 (ref 9–23)
CALCIUM SERPL-MCNC: 9.9 MG/DL (ref 8.7–10.2)
CHLORIDE SERPL-SCNC: 102 MMOL/L (ref 96–106)
CHOLEST SERPL-MCNC: 203 MG/DL (ref 100–199)
CO2 SERPL-SCNC: 25 MMOL/L (ref 18–29)
COMMENT 011824: ABNORMAL
CREAT SERPL-MCNC: 0.82 MG/DL (ref 0.57–1)
EOSINOPHIL # BLD AUTO: 0.1 X10E3/UL (ref 0–0.4)
EOSINOPHIL NFR BLD AUTO: 2 %
ERYTHROCYTE [DISTWIDTH] IN BLOOD BY AUTOMATED COUNT: 13.3 % (ref 12.3–15.4)
ERYTHROCYTE [SEDIMENTATION RATE] IN BLOOD BY WESTERGREN METHOD: 2 MM/HR (ref 0–40)
GLOBULIN SER CALC-MCNC: 2.7 G/DL (ref 1.5–4.5)
GLUCOSE SERPL-MCNC: 102 MG/DL (ref 65–99)
HCT VFR BLD AUTO: 47.4 % (ref 34–46.6)
HDLC SERPL-MCNC: 83 MG/DL
HGB BLD-MCNC: 15.3 G/DL (ref 11.1–15.9)
IMM GRANULOCYTES # BLD: 0 X10E3/UL (ref 0–0.1)
IMM GRANULOCYTES NFR BLD: 0 %
IMMATURE CELLS  115398: ABNORMAL
LDLC SERPL CALC-MCNC: 106 MG/DL (ref 0–99)
LYMPHOCYTES # BLD AUTO: 1.1 X10E3/UL (ref 0.7–3.1)
LYMPHOCYTES NFR BLD AUTO: 24 %
MCH RBC QN AUTO: 29 PG (ref 26.6–33)
MCHC RBC AUTO-ENTMCNC: 32.3 G/DL (ref 31.5–35.7)
MCV RBC AUTO: 90 FL (ref 79–97)
MONOCYTES # BLD AUTO: 0.4 X10E3/UL (ref 0.1–0.9)
MONOCYTES NFR BLD AUTO: 8 %
MORPHOLOGY BLD-IMP: ABNORMAL
NEUTROPHILS # BLD AUTO: 3 X10E3/UL (ref 1.4–7)
NEUTROPHILS NFR BLD AUTO: 66 %
NRBC BLD AUTO-RTO: ABNORMAL %
PLATELET # BLD AUTO: 2 X10E3/UL (ref 150–379)
POTASSIUM SERPL-SCNC: 4.1 MMOL/L (ref 3.5–5.2)
PROT SERPL-MCNC: 7.5 G/DL (ref 6–8.5)
RBC # BLD AUTO: 5.28 X10E6/UL (ref 3.77–5.28)
SODIUM SERPL-SCNC: 143 MMOL/L (ref 134–144)
TRIGL SERPL-MCNC: 71 MG/DL (ref 0–149)
VLDLC SERPL CALC-MCNC: 14 MG/DL (ref 5–40)
WBC # BLD AUTO: 4.6 X10E3/UL (ref 3.4–10.8)

## 2017-06-09 LAB
BASOPHILS # BLD AUTO: 0 X10E3/UL (ref 0–0.2)
BASOPHILS NFR BLD AUTO: 0 %
EOSINOPHIL # BLD AUTO: 0 X10E3/UL (ref 0–0.4)
EOSINOPHIL NFR BLD AUTO: 0 %
ERYTHROCYTE [DISTWIDTH] IN BLOOD BY AUTOMATED COUNT: 13.4 % (ref 12.3–15.4)
HCT VFR BLD AUTO: 46.2 % (ref 34–46.6)
HGB BLD-MCNC: 14.7 G/DL (ref 11.1–15.9)
IMM GRANULOCYTES # BLD: 0 X10E3/UL (ref 0–0.1)
IMM GRANULOCYTES NFR BLD: 1 %
IMMATURE CELLS  115398: ABNORMAL
LYMPHOCYTES # BLD AUTO: 0.6 X10E3/UL (ref 0.7–3.1)
LYMPHOCYTES NFR BLD AUTO: 13 %
MCH RBC QN AUTO: 28.6 PG (ref 26.6–33)
MCHC RBC AUTO-ENTMCNC: 31.8 G/DL (ref 31.5–35.7)
MCV RBC AUTO: 90 FL (ref 79–97)
MONOCYTES # BLD AUTO: 0.2 X10E3/UL (ref 0.1–0.9)
MONOCYTES NFR BLD AUTO: 3 %
MORPHOLOGY BLD-IMP: ABNORMAL
NEUTROPHILS # BLD AUTO: 4.2 X10E3/UL (ref 1.4–7)
NEUTROPHILS NFR BLD AUTO: 83 %
NRBC BLD AUTO-RTO: ABNORMAL %
PLATELET # BLD AUTO: 179 X10E3/UL (ref 150–379)
RBC # BLD AUTO: 5.14 X10E6/UL (ref 3.77–5.28)
WBC # BLD AUTO: 5 X10E3/UL (ref 3.4–10.8)

## 2017-06-13 ENCOUNTER — APPOINTMENT (OUTPATIENT)
Dept: MEDICAL GROUP | Facility: MEDICAL CENTER | Age: 57
End: 2017-06-13
Payer: COMMERCIAL

## 2017-06-28 ENCOUNTER — HOSPITAL ENCOUNTER (INPATIENT)
Facility: MEDICAL CENTER | Age: 57
LOS: 1 days | DRG: 813 | End: 2017-06-29
Attending: EMERGENCY MEDICINE | Admitting: HOSPITALIST
Payer: COMMERCIAL

## 2017-06-28 ENCOUNTER — APPOINTMENT (OUTPATIENT)
Dept: ONCOLOGY | Facility: MEDICAL CENTER | Age: 57
End: 2017-06-28
Attending: SPECIALIST
Payer: COMMERCIAL

## 2017-06-28 ENCOUNTER — RESOLUTE PROFESSIONAL BILLING HOSPITAL PROF FEE (OUTPATIENT)
Dept: HOSPITALIST | Facility: MEDICAL CENTER | Age: 57
End: 2017-06-28
Payer: COMMERCIAL

## 2017-06-28 DIAGNOSIS — D69.6 THROMBOCYTOPENIA (HCC): ICD-10-CM

## 2017-06-28 PROBLEM — D69.3 ACUTE ITP (HCC): Status: ACTIVE | Noted: 2017-06-28

## 2017-06-28 LAB
ABO GROUP BLD: NORMAL
ANION GAP SERPL CALC-SCNC: 8 MMOL/L (ref 0–11.9)
BASOPHILS # BLD AUTO: 0.9 % (ref 0–1.8)
BASOPHILS # BLD: 0.03 K/UL (ref 0–0.12)
BLD GP AB SCN SERPL QL: NORMAL
BUN SERPL-MCNC: 17 MG/DL (ref 8–22)
CALCIUM SERPL-MCNC: 9.5 MG/DL (ref 8.5–10.5)
CHLORIDE SERPL-SCNC: 108 MMOL/L (ref 96–112)
CO2 SERPL-SCNC: 25 MMOL/L (ref 20–33)
CREAT SERPL-MCNC: 0.75 MG/DL (ref 0.5–1.4)
EOSINOPHIL # BLD AUTO: 0.16 K/UL (ref 0–0.51)
EOSINOPHIL NFR BLD: 4.4 % (ref 0–6.9)
ERYTHROCYTE [DISTWIDTH] IN BLOOD BY AUTOMATED COUNT: 40.6 FL (ref 35.9–50)
GFR SERPL CREATININE-BSD FRML MDRD: >60 ML/MIN/1.73 M 2
GIANT PLATELETS BLD QL SMEAR: NORMAL
GLUCOSE SERPL-MCNC: 96 MG/DL (ref 65–99)
HCT VFR BLD AUTO: 41.5 % (ref 37–47)
HGB BLD-MCNC: 13.6 G/DL (ref 12–16)
INR PPP: 0.94 (ref 0.87–1.13)
LDH SERPL-CCNC: 159 U/L (ref 107–266)
LYMPHOCYTES # BLD AUTO: 1.03 K/UL (ref 1–4.8)
LYMPHOCYTES NFR BLD: 27.8 % (ref 22–41)
MANUAL DIFF BLD: NORMAL
MCH RBC QN AUTO: 28.5 PG (ref 27–33)
MCHC RBC AUTO-ENTMCNC: 32.8 G/DL (ref 33.6–35)
MCV RBC AUTO: 87 FL (ref 81.4–97.8)
METAMYELOCYTES NFR BLD MANUAL: 0.9 %
MONOCYTES # BLD AUTO: 0.1 K/UL (ref 0–0.85)
MONOCYTES NFR BLD AUTO: 2.6 % (ref 0–13.4)
MORPHOLOGY BLD-IMP: NORMAL
NEUTROPHILS # BLD AUTO: 2.35 K/UL (ref 2–7.15)
NEUTROPHILS NFR BLD: 59.1 % (ref 44–72)
NEUTS BAND NFR BLD MANUAL: 4.3 % (ref 0–10)
NRBC # BLD AUTO: 0 K/UL
NRBC BLD AUTO-RTO: 0 /100 WBC
PLATELET # BLD AUTO: 6 K/UL (ref 164–446)
PLATELET BLD QL SMEAR: NORMAL
PLATELETS.RETICULATED NFR BLD AUTO: 24.4 K/UL (ref 0.6–13.1)
POTASSIUM SERPL-SCNC: 3.7 MMOL/L (ref 3.6–5.5)
PROTHROMBIN TIME: 12.9 SEC (ref 12–14.6)
RBC # BLD AUTO: 4.77 M/UL (ref 4.2–5.4)
RBC BLD AUTO: PRESENT
RH BLD: NORMAL
SODIUM SERPL-SCNC: 141 MMOL/L (ref 135–145)
VARIANT LYMPHS BLD QL SMEAR: NORMAL
WBC # BLD AUTO: 3.7 K/UL (ref 4.8–10.8)

## 2017-06-28 PROCEDURE — 86901 BLOOD TYPING SEROLOGIC RH(D): CPT

## 2017-06-28 PROCEDURE — 80048 BASIC METABOLIC PNL TOTAL CA: CPT | Mod: 91

## 2017-06-28 PROCEDURE — 700111 HCHG RX REV CODE 636 W/ 250 OVERRIDE (IP): Performed by: HOSPITALIST

## 2017-06-28 PROCEDURE — 700111 HCHG RX REV CODE 636 W/ 250 OVERRIDE (IP): Performed by: EMERGENCY MEDICINE

## 2017-06-28 PROCEDURE — 85055 RETICULATED PLATELET ASSAY: CPT

## 2017-06-28 PROCEDURE — 85007 BL SMEAR W/DIFF WBC COUNT: CPT

## 2017-06-28 PROCEDURE — 30233S1 TRANSFUSION OF NONAUTOLOGOUS GLOBULIN INTO PERIPHERAL VEIN, PERCUTANEOUS APPROACH: ICD-10-PCS | Performed by: HOSPITALIST

## 2017-06-28 PROCEDURE — 36415 COLL VENOUS BLD VENIPUNCTURE: CPT

## 2017-06-28 PROCEDURE — 770009 HCHG ROOM/CARE - ONCOLOGY SEMI PRI*

## 2017-06-28 PROCEDURE — 302135 SEQUENTIAL COMPRESSION MACHINE: Performed by: INTERNAL MEDICINE

## 2017-06-28 PROCEDURE — 86850 RBC ANTIBODY SCREEN: CPT

## 2017-06-28 PROCEDURE — 99223 1ST HOSP IP/OBS HIGH 75: CPT | Performed by: HOSPITALIST

## 2017-06-28 PROCEDURE — 85025 COMPLETE CBC W/AUTO DIFF WBC: CPT

## 2017-06-28 PROCEDURE — 85027 COMPLETE CBC AUTOMATED: CPT

## 2017-06-28 PROCEDURE — 99285 EMERGENCY DEPT VISIT HI MDM: CPT

## 2017-06-28 PROCEDURE — 83615 LACTATE (LD) (LDH) ENZYME: CPT | Mod: 91

## 2017-06-28 PROCEDURE — 86900 BLOOD TYPING SEROLOGIC ABO: CPT

## 2017-06-28 PROCEDURE — 96374 THER/PROPH/DIAG INJ IV PUSH: CPT

## 2017-06-28 PROCEDURE — 700105 HCHG RX REV CODE 258: Performed by: HOSPITALIST

## 2017-06-28 PROCEDURE — 85610 PROTHROMBIN TIME: CPT

## 2017-06-28 RX ORDER — IMMUNE GLOBULIN 50 MG/ML
10 SOLUTION INTRAVENOUS ONCE
Status: COMPLETED | OUTPATIENT
Start: 2017-06-28 | End: 2017-06-29

## 2017-06-28 RX ORDER — PROMETHAZINE HYDROCHLORIDE 25 MG/1
12.5-25 TABLET ORAL EVERY 4 HOURS PRN
Status: DISCONTINUED | OUTPATIENT
Start: 2017-06-28 | End: 2017-06-29 | Stop reason: HOSPADM

## 2017-06-28 RX ORDER — METHYLPREDNISOLONE SODIUM SUCCINATE 125 MG/2ML
125 INJECTION, POWDER, LYOPHILIZED, FOR SOLUTION INTRAMUSCULAR; INTRAVENOUS EVERY 12 HOURS
Status: DISCONTINUED | OUTPATIENT
Start: 2017-06-28 | End: 2017-06-29 | Stop reason: HOSPADM

## 2017-06-28 RX ORDER — SODIUM CHLORIDE 9 MG/ML
INJECTION, SOLUTION INTRAVENOUS CONTINUOUS
Status: DISCONTINUED | OUTPATIENT
Start: 2017-06-28 | End: 2017-06-29 | Stop reason: HOSPADM

## 2017-06-28 RX ORDER — PROMETHAZINE HYDROCHLORIDE 25 MG/1
12.5-25 SUPPOSITORY RECTAL EVERY 4 HOURS PRN
Status: DISCONTINUED | OUTPATIENT
Start: 2017-06-28 | End: 2017-06-29 | Stop reason: HOSPADM

## 2017-06-28 RX ORDER — METHYLPREDNISOLONE SODIUM SUCCINATE 125 MG/2ML
125 INJECTION, POWDER, LYOPHILIZED, FOR SOLUTION INTRAMUSCULAR; INTRAVENOUS ONCE
Status: COMPLETED | OUTPATIENT
Start: 2017-06-28 | End: 2017-06-28

## 2017-06-28 RX ORDER — ONDANSETRON 4 MG/1
4 TABLET, ORALLY DISINTEGRATING ORAL EVERY 4 HOURS PRN
Status: DISCONTINUED | OUTPATIENT
Start: 2017-06-28 | End: 2017-06-29 | Stop reason: HOSPADM

## 2017-06-28 RX ORDER — ONDANSETRON 2 MG/ML
4 INJECTION INTRAMUSCULAR; INTRAVENOUS EVERY 4 HOURS PRN
Status: DISCONTINUED | OUTPATIENT
Start: 2017-06-28 | End: 2017-06-29 | Stop reason: HOSPADM

## 2017-06-28 RX ADMIN — METHYLPREDNISOLONE SODIUM SUCCINATE 125 MG: 125 INJECTION, POWDER, FOR SOLUTION INTRAMUSCULAR; INTRAVENOUS at 17:23

## 2017-06-28 RX ADMIN — SODIUM CHLORIDE: 9 INJECTION, SOLUTION INTRAVENOUS at 21:32

## 2017-06-28 RX ADMIN — IMMUNE GLOBULIN 10 G: 50 SOLUTION INTRAVENOUS at 22:00

## 2017-06-28 ASSESSMENT — LIFESTYLE VARIABLES
HAVE PEOPLE ANNOYED YOU BY CRITICIZING YOUR DRINKING: NO
ALCOHOL_USE: YES
ON A TYPICAL DAY WHEN YOU DRINK ALCOHOL HOW MANY DRINKS DO YOU HAVE: 1
TOTAL SCORE: 0
EVER HAD A DRINK FIRST THING IN THE MORNING TO STEADY YOUR NERVES TO GET RID OF A HANGOVER: NO
CONSUMPTION TOTAL: NEGATIVE
EVER_SMOKED: NEVER
TOTAL SCORE: 0
EVER FELT BAD OR GUILTY ABOUT YOUR DRINKING: NO
AVERAGE NUMBER OF DAYS PER WEEK YOU HAVE A DRINK CONTAINING ALCOHOL: 1
TOTAL SCORE: 0
HAVE YOU EVER FELT YOU SHOULD CUT DOWN ON YOUR DRINKING: NO
HOW MANY TIMES IN THE PAST YEAR HAVE YOU HAD 5 OR MORE DRINKS IN A DAY: 0

## 2017-06-28 ASSESSMENT — PAIN SCALES - GENERAL
PAINLEVEL_OUTOF10: 0

## 2017-06-28 NOTE — ED NOTES
Chief Complaint   Patient presents with   • Abnormal Labs     low platelets     Agree with triage RN. Pt states that she does not have increase in petechiae.   Chart up for ERP.

## 2017-06-28 NOTE — IP AVS SNAPSHOT
" <p align=\"LEFT\"><IMG SRC=\"//[email or web address]$/[email or web address]\" alt=\"Image\" WIDTH=\"50%\" HEIGHT=\"200\" BORDER=\"\"></p>                   Name:Pily Anderson  Medical Record Number:[id number]  CSN: [id number]    YOB: 1960   Age: 56 y.o.  Sex: female  HT:1.676 m (5' 5.98\") WT: 67.9 kg (149 lb 11.1 oz)          Admit Date: 6/28/2017     Discharge Date:   Today's Date: 6/29/2017  Attending Doctor:  Radha Crow M.D.                  Allergies:  Review of patient's allergies indicates no known allergies.          Your appointments     Jul 05, 2017  3:00 PM   Est Chemo Education with RN 4   [facility] ([facility])    [address] [place] [id number]   [phone]            Jul 06, 2017 10:00 AM   Est Chemo 4 with RN 1   [facility] ([facility])    [address] [place] [id number]   [phone]              Follow-up Information     1. Schedule an appointment as soon as possible for a visit with MADDISON Coronado M.D..    Specialty:  Oncology    Contact information    [address] [facility] [id number] [phone]           Medication List      Take these Medications        Instructions    dexamethasone 4 MG Tabs   Commonly known as:  DECADRON    Take 10 Tabs by mouth every day for 4 days.   Dose:  40 mg       omeprazole 20 MG delayed-release capsule   Commonly known as:  PRILOSEC    Take 1 Cap by mouth every day for 5 days.   Dose:  20 mg         "

## 2017-06-28 NOTE — IP AVS SNAPSHOT
6/29/2017    Pily LUX Salehrobert  [address]  [place] [address]    Dear Pily:    [facility] wants to ensure your discharge home is safe and you or your loved ones have had all of your questions answered regarding your care after you leave the hospital.    Below is a list of resources and contact information should you have any questions regarding your hospital stay, follow-up instructions, or active medical symptoms.    Questions or Concerns Regarding… Contact   Medical Questions Related to Your Discharge  (7 days a week, 8am-5pm) Contact a Nurse Care Coordinator   [phone]   Medical Questions Not Related to Your Discharge  (24 hours a day / 7 days a week)  Contact the Nurse Health Line   [phone]    Medications or Discharge Instructions Refer to your discharge packet   or contact your [facility] Primary Care Provider   [phone]   Follow-up Appointment(s) Schedule your appointment via VaporWire   or contact Scheduling [phone]   Billing Review your statement via VaporWire  or contact Billing [phone]   Medical Records Review your records via VaporWire   or contact Medical Records [phone]     You may receive a telephone call within two days of discharge. This call is to make certain you understand your discharge instructions and have the opportunity to have any questions answered. You can also easily access your medical information, test results and upcoming appointments via the VaporWire free online health management tool. You can learn more and sign up at [email or web address]. For assistance setting up your VaporWire account, please call [phone].    Once again, we want to ensure your discharge home is safe and that you have a clear understanding of any next steps in your care. If you have any questions or concerns, please do not hesitate to contact us, we are here for you. Thank you for choosing [facility] for your healthcare needs.    Sincerely,    Your [facility] Healthcare Team

## 2017-06-28 NOTE — IP AVS SNAPSHOT
9flats Access Code: [email or web address]  Expires: 6/30/2017  2:55 PM    9flats  A secure, online tool to manage your health information     [employer]’s 9flats® is a secure, online tool that connects you to your personalized health information from the privacy of your home -- day or night - making it very easy for you to manage your healthcare. Once the activation process is completed, you can even access your medical information using the 9flats colby, which is available for free in the Apple Colby store or Google Play store.     9flats provides the following levels of access (as shown below):   My Chart Features   [facility] Primary Care Doctor [facility]  Specialists [facility]  Urgent  Care Non-[facility]  Primary Care  Doctor   Email your healthcare team securely and privately 24/7 X X X X   Manage appointments: schedule your next appointment; view details of past/upcoming appointments X      Request prescription refills. X      View recent personal medical records, including lab and immunizations X X X X   View health record, including health history, allergies, medications X X X X   Read reports about your outpatient visits, procedures, consult and ER notes X X X X   See your discharge summary, which is a recap of your hospital and/or ER visit that includes your diagnosis, lab results, and care plan. X X       How to register for 9flats:  1. Go to  [email or web address].  2. Click on the Sign Up Now box, which takes you to the New Member Sign Up page. You will need to provide the following information:  a. Enter your 9flats Access Code exactly as it appears at the top of this page. (You will not need to use this code after you’ve completed the sign-up process. If you do not sign up before the expiration date, you must request a new code.)   b. Enter your date of birth.   c. Enter your home email address.   d. Click Submit, and follow the next screen’s instructions.  3. Create a 9flats ID. This will be your 9flats  login ID and cannot be changed, so think of one that is secure and easy to remember.  4. Create a Equals[employer] password. You can change your password at any time.  5. Enter your Password Reset Question and Answer. This can be used at a later time if you forget your password.   6. Enter your e-mail address. This allows you to receive e-mail notifications when new information is available in Equals6.  7. Click Sign Up. You can now view your health information.    For assistance activating your Equals6 account, call [phone]

## 2017-06-28 NOTE — IP AVS SNAPSHOT
" Home Care Instructions                                                                                                                  Name:Pily Anderson  Medical Record Number:[id number]  CSN: [id number]    YOB: 1960   Age: 56 y.o.  Sex: female  HT:1.676 m (5' 5.98\") WT: 67.9 kg (149 lb 11.1 oz)          Admit Date: 6/28/2017     Discharge Date:   Today's Date: 6/29/2017  Attending Doctor:  Radha Crow M.D.                  Allergies:  Review of patient's allergies indicates no known allergies.            Discharge Instructions       Thrombocytopenia  Thrombocytopenia is a condition in which there is an abnormally small number of platelets in your blood. Platelets are also called thrombocytes. Platelets are needed for blood clotting.  CAUSES  Thrombocytopenia is caused by:   · Decreased production of platelets. This can be caused by:  ¨ Aplastic anemia in which your bone marrow quits making blood cells.  ¨ Cancer in the bone marrow.  ¨ Use of certain medicines, including chemotherapy.  ¨ Infection in the bone marrow.  ¨ Heavy alcohol consumption.  · Increased destruction of platelets. This can be caused by:  ¨ Certain immune diseases.  ¨ Use of certain drugs.  ¨ Certain blood clotting disorders.  ¨ Certain inherited disorders.  ¨ Certain bleeding disorders.  ¨ Pregnancy.  · Having an enlarged spleen (hypersplenism). In hypersplenism, the spleen gathers up platelets from circulation. This means the platelets are not available to help with blood clotting. The spleen can enlarge due to cirrhosis or other conditions.  SYMPTOMS   The symptoms of thrombocytopenia are side effects of poor blood clotting. Some of these are:  1. Abnormal bleeding.  2. Nosebleeds.  3. Heavy menstrual periods.  4. Blood in the urine or stools.  5. Purpura. This is a purplish discoloration in the skin produced by small bleeding vessels near the surface of the skin.  6. Bruising.  7. A rash that may be petechial. This looks like " pinpoint, purplish-red spots on the skin and mucous membranes. It is caused by bleeding from small blood vessels (capillaries).  DIAGNOSIS   Your caregiver will make this diagnosis based on your exam and blood tests. Sometimes, a bone marrow study is done to look for the original cells (megakaryocytes) that make platelets.  TREATMENT   Treatment depends on the cause of the condition.  1. Medicines may be given to help protect your platelets from being destroyed.  2. In some cases, a replacement (transfusion) of platelets may be required to stop or prevent bleeding.  3. Sometimes, the spleen must be surgically removed.  HOME CARE INSTRUCTIONS   1. Check the skin and linings inside your mouth for bruising or bleeding as directed by your caregiver.  2. Check your sputum, urine, and stool for blood as directed by your caregiver.  3. Do not return to any activities that could cause bumps or bruises until your caregiver says it is okay.  4. Take extra care not to cut yourself when shaving or when using scissors, needles, knives, and other tools.  5. Take extra care not to burn yourself when ironing or cooking.  6. Ask your caregiver if it is okay for you to drink alcohol.  7. Only take over-the-counter or prescription medicines as directed by your caregiver.  8. Notify all your caregivers, including dentists and eye doctors, about your condition.  SEEK IMMEDIATE MEDICAL CARE IF:   1. You develop active bleeding from anywhere in your body.  2. You develop unexplained bruising or bleeding.  3. You have blood in your sputum, urine, or stool.  MAKE SURE YOU:  · Understand these instructions.  · Will watch your condition.  · Will get help right away if you are not doing well or get worse.     This information is not intended to replace advice given to you by your health care provider. Make sure you discuss any questions you have with your health care provider.     Document Released: 12/18/2006 Document Revised: 03/11/2013  Document Reviewed: 10/19/2012  [employer] Interactive Patient Education ©2016 [employer]    Discharge Instructions    Discharged to home by car with relative. Discharged via walking, hospital escort: Refused.  Special equipment needed: Not Applicable    Be sure to schedule a follow-up appointment with your primary care doctor or any specialists as instructed.     Discharge Plan:   Diet Plan: Discussed  Activity Level: Discussed  Confirmed Follow up Appointment: Patient to Call and Schedule Appointment  Confirmed Symptoms Management: Discussed  Medication Reconciliation Updated: Yes  Influenza Vaccine Indication: Patient Refuses    I understand that a diet low in cholesterol, fat, and sodium is recommended for good health. Unless I have been given specific instructions below for another diet, I accept this instruction as my diet prescription.   Other diet: Regular as tolerated    Special Instructions: None    · Is patient discharged on Warfarin / Coumadin?   No     · Is patient Post Blood Transfusion?  No    Depression / Suicide Risk    As you are discharged from this [facility] facility, it is important to learn how to keep safe from harming yourself.    Recognize the warning signs:  · Abrupt changes in personality, positive or negative- including increase in energy   · Giving away possessions  · Change in eating patterns- significant weight changes-  positive or negative  · Change in sleeping patterns- unable to sleep or sleeping all the time   · Unwillingness or inability to communicate  · Depression  · Unusual sadness, discouragement and loneliness  · Talk of wanting to die  · Neglect of personal appearance   · Rebelliousness- reckless behavior  · Withdrawal from people/activities they love  · Confusion- inability to concentrate     If you or a loved one observes any of these behaviors or has concerns about self-harm, here's what you can do:  · Talk about it- your feelings and reasons for harming  yourself  · Remove any means that you might use to hurt yourself (examples: pills, rope, extension cords, firearm)  · Get professional help from the community (Mental Health, Substance Abuse, psychological counseling)  · Do not be alone:Call your Safe Contact- someone whom you trust who will be there for you.  · Call your local CRISIS HOTLINE 560-6126 or 100-847-3268  · Call your local Children's Mobile Crisis Response Team [place] (798) 201-7469 or wwwTravelTipz.ru  · Call the toll free National Suicide Prevention Hotlines   · National Suicide Prevention Lifeline 703-858-CBED (3010)  · [place] Hope Line Network 800-SUICIDE (534-0904)          Your appointments     Jul 05, 2017  3:00 PM   Est Chemo Education with RN 4   Infusion Services ([facility])    [address] [place] [id number] [phone]            Jul 06, 2017 10:00 AM   Est Chemo 4 with RN 1   Infusion Services ([facility])    [address] [place] [id number] [phone]              Follow-up Information     1. Schedule an appointment as soon as possible for a visit with MADDISON Coronado M.D..    Specialty:  Oncology    Contact information    [address] [facility] [address] [phone]           Discharge Medication Instructions:    Below are the medications your physician expects you to take upon discharge:    Review all your home medications and newly ordered medications with your doctor and/or pharmacist. Follow medication instructions as directed by your doctor and/or pharmacist.    Please keep your medication list with you and share with your physician.               Medication List      START taking these medications        Instructions    Morning Afternoon Evening Bedtime    dexamethasone 4 MG Tabs   Commonly known as:  DECADRON   Next Dose Due:  Today        Take 10 Tabs by mouth every day for 4 days.   Dose:  40 mg                        omeprazole 20 MG delayed-release capsule   Commonly known as:   PRILOSEC   Next Dose Due:  Tomorrow morning        Take 1 Cap by mouth every day for 5 days.   Dose:  20 mg                             Where to Get Your Medications      These medications were sent to [facility] [place] - [address] [address]     Phone:  [phone]    - dexamethasone 4 MG Tabs  - omeprazole 20 MG delayed-release capsule            Instructions           Diet / Nutrition:    Follow any diet instructions given to you by your doctor or the dietician, including how much salt (sodium) you are allowed each day.    If you are overweight, talk to your doctor about a weight reduction plan.    Activity:    Remain physically active following your doctor's instructions about exercise and activity.    Rest often.     Any time you become even a little tired or short of breath, SIT DOWN and rest.    Worsening Symptoms:    Report any of the following signs and symptoms to the doctor's office immediately:    *Pain of jaw, arm, or neck  *Chest pain not relieved by medication                               *Dizziness or loss of consciousness  *Difficulty breathing even when at rest   *More tired than usual                                       *Bleeding drainage or swelling of surgical site  *Swelling of feet, ankles, legs or stomach                 *Fever (>100ºF)  *Pink or blood tinged sputum  *Weight gain (3lbs/day or 5lbs /week)           *Shock from internal defibrillator (if applicable)  *Palpitations or irregular heartbeats                *Cool and/or numb extremities    Stroke Awareness    Common Risk Factors for Stroke include:    Age  Atrial Fibrillation  Carotid Artery Stenosis  Diabetes Mellitus  Excessive alcohol consumption  High blood pressure  Overweight   Physical inactivity  Smoking    Warning signs and symptoms of a stroke include:    *Sudden numbness or weakness of the face, arm or leg (especially on one side of the body).  *Sudden confusion, trouble speaking  or understanding.  *Sudden trouble seeing in one or both eyes.  *Sudden trouble walking, dizziness, loss of balance or coordination.Sudden severe headache with no known cause.    It is very important to get treatment quickly when a stroke occurs. If you experience any of the above warning signs, call 911 immediately.                   Disclaimer         Quit Smoking / Tobacco Use:    I understand the use of any tobacco products increases my chance of suffering from future heart disease or stroke and could cause other illnesses which may shorten my life. Quitting the use of tobacco products is the single most important thing I can do to improve my health. For further information on smoking / tobacco cessation call a Toll Free Quit Line at 1-232.183.3465 (*National Cancer [place]) or 1-918.529.9122 (American Lung Association) or you can access the web based program at www.lungusa.org.    [place] Tobacco Users Help Line:  (437) 225-8061       Toll Free: 1-693.373.9886  Quit Tobacco Program [facility] (204)747-1834    Crisis Hotline:    [place] Crisis Hotline:  9-606-SUTQBCJ or 1-409.276.8269    [place] Crisis Hotline:    1-428.264.7516 or 036-363-9321    Discharge Survey:   Thank you for choosing [facility]. We hope we did everything we could to make your hospital stay a pleasant one. You may be receiving a phone survey and we would appreciate your time and participation in answering the questions. Your input is very valuable to us in our efforts to improve our service to our patients and their families.        My signature on this form indicates that:    1. I have reviewed and understand the above information.  2. My questions regarding this information have been answered to my satisfaction.  3. I have formulated a plan with my discharge nurse to obtain my prescribed medications for home.                  Disclaimer         __________________________________                     __________        ________                       Patient Signature                                                 Date                    Time

## 2017-06-29 VITALS
DIASTOLIC BLOOD PRESSURE: 66 MMHG | HEIGHT: 66 IN | SYSTOLIC BLOOD PRESSURE: 117 MMHG | BODY MASS INDEX: 24.06 KG/M2 | HEART RATE: 79 BPM | RESPIRATION RATE: 18 BRPM | TEMPERATURE: 98.8 F | OXYGEN SATURATION: 96 % | WEIGHT: 149.69 LBS

## 2017-06-29 LAB
ANION GAP SERPL CALC-SCNC: 7 MMOL/L (ref 0–11.9)
BASOPHILS # BLD AUTO: 0.2 % (ref 0–1.8)
BASOPHILS # BLD AUTO: 0.3 % (ref 0–1.8)
BASOPHILS # BLD: 0.01 K/UL (ref 0–0.12)
BASOPHILS # BLD: 0.02 K/UL (ref 0–0.12)
BUN SERPL-MCNC: 19 MG/DL (ref 8–22)
CALCIUM SERPL-MCNC: 9.5 MG/DL (ref 8.5–10.5)
CHLORIDE SERPL-SCNC: 104 MMOL/L (ref 96–112)
CO2 SERPL-SCNC: 25 MMOL/L (ref 20–33)
CREAT SERPL-MCNC: 0.96 MG/DL (ref 0.5–1.4)
EOSINOPHIL # BLD AUTO: 0 K/UL (ref 0–0.51)
EOSINOPHIL # BLD AUTO: 0.01 K/UL (ref 0–0.51)
EOSINOPHIL NFR BLD: 0 % (ref 0–6.9)
EOSINOPHIL NFR BLD: 0.2 % (ref 0–6.9)
ERYTHROCYTE [DISTWIDTH] IN BLOOD BY AUTOMATED COUNT: 40.1 FL (ref 35.9–50)
ERYTHROCYTE [DISTWIDTH] IN BLOOD BY AUTOMATED COUNT: 40.6 FL (ref 35.9–50)
GFR SERPL CREATININE-BSD FRML MDRD: >60 ML/MIN/1.73 M 2
GLUCOSE SERPL-MCNC: 254 MG/DL (ref 65–99)
HCT VFR BLD AUTO: 41.8 % (ref 37–47)
HCT VFR BLD AUTO: 42 % (ref 37–47)
HGB BLD-MCNC: 13.8 G/DL (ref 12–16)
HGB BLD-MCNC: 13.8 G/DL (ref 12–16)
IMM GRANULOCYTES # BLD AUTO: 0.03 K/UL (ref 0–0.11)
IMM GRANULOCYTES # BLD AUTO: 0.03 K/UL (ref 0–0.11)
IMM GRANULOCYTES NFR BLD AUTO: 0.5 % (ref 0–0.9)
IMM GRANULOCYTES NFR BLD AUTO: 0.6 % (ref 0–0.9)
LDH SERPL-CCNC: 144 U/L (ref 107–266)
LYMPHOCYTES # BLD AUTO: 0.36 K/UL (ref 1–4.8)
LYMPHOCYTES # BLD AUTO: 0.46 K/UL (ref 1–4.8)
LYMPHOCYTES NFR BLD: 7 % (ref 22–41)
LYMPHOCYTES NFR BLD: 7.7 % (ref 22–41)
MCH RBC QN AUTO: 28.6 PG (ref 27–33)
MCH RBC QN AUTO: 28.6 PG (ref 27–33)
MCHC RBC AUTO-ENTMCNC: 32.9 G/DL (ref 33.6–35)
MCHC RBC AUTO-ENTMCNC: 33 G/DL (ref 33.6–35)
MCV RBC AUTO: 86.7 FL (ref 81.4–97.8)
MCV RBC AUTO: 87 FL (ref 81.4–97.8)
MONOCYTES # BLD AUTO: 0.03 K/UL (ref 0–0.85)
MONOCYTES # BLD AUTO: 0.32 K/UL (ref 0–0.85)
MONOCYTES NFR BLD AUTO: 0.6 % (ref 0–13.4)
MONOCYTES NFR BLD AUTO: 4.9 % (ref 0–13.4)
NEUTROPHILS # BLD AUTO: 4.21 K/UL (ref 2–7.15)
NEUTROPHILS # BLD AUTO: 5.76 K/UL (ref 2–7.15)
NEUTROPHILS NFR BLD: 87.3 % (ref 44–72)
NEUTROPHILS NFR BLD: 90.7 % (ref 44–72)
NRBC # BLD AUTO: 0 K/UL
NRBC # BLD AUTO: 0 K/UL
NRBC BLD AUTO-RTO: 0 /100 WBC
NRBC BLD AUTO-RTO: 0 /100 WBC
PLATELET # BLD AUTO: 20 K/UL (ref 164–446)
PLATELET # BLD AUTO: 5 K/UL (ref 164–446)
PMV BLD AUTO: 13.8 FL (ref 9–12.9)
POTASSIUM SERPL-SCNC: 3.7 MMOL/L (ref 3.6–5.5)
RBC # BLD AUTO: 4.82 M/UL (ref 4.2–5.4)
RBC # BLD AUTO: 4.83 M/UL (ref 4.2–5.4)
SODIUM SERPL-SCNC: 136 MMOL/L (ref 135–145)
WBC # BLD AUTO: 4.7 K/UL (ref 4.8–10.8)
WBC # BLD AUTO: 6.6 K/UL (ref 4.8–10.8)

## 2017-06-29 PROCEDURE — 700111 HCHG RX REV CODE 636 W/ 250 OVERRIDE (IP): Performed by: HOSPITALIST

## 2017-06-29 PROCEDURE — 36415 COLL VENOUS BLD VENIPUNCTURE: CPT

## 2017-06-29 PROCEDURE — 700102 HCHG RX REV CODE 250 W/ 637 OVERRIDE(OP): Performed by: INTERNAL MEDICINE

## 2017-06-29 PROCEDURE — 99239 HOSP IP/OBS DSCHRG MGMT >30: CPT | Performed by: INTERNAL MEDICINE

## 2017-06-29 PROCEDURE — 85025 COMPLETE CBC W/AUTO DIFF WBC: CPT

## 2017-06-29 RX ORDER — IMMUNE GLOBULIN 50 MG/ML
60 SOLUTION INTRAVENOUS ONCE
Status: DISCONTINUED | OUTPATIENT
Start: 2017-06-29 | End: 2017-06-29

## 2017-06-29 RX ORDER — OMEPRAZOLE 20 MG/1
20 CAPSULE, DELAYED RELEASE ORAL DAILY
Qty: 5 CAP | Refills: 0 | Status: SHIPPED | OUTPATIENT
Start: 2017-06-29 | End: 2017-07-04

## 2017-06-29 RX ORDER — DEXAMETHASONE 4 MG/1
40 TABLET ORAL DAILY
Qty: 40 TAB | Refills: 0 | Status: SHIPPED | OUTPATIENT
Start: 2017-06-29 | End: 2017-07-03

## 2017-06-29 RX ADMIN — IMMUNE GLOBULIN 10 G: 50 SOLUTION INTRAVENOUS at 01:25

## 2017-06-29 RX ADMIN — IMMUNE GLOBULIN 10 G: 50 SOLUTION INTRAVENOUS at 06:09

## 2017-06-29 RX ADMIN — IMMUNE GLOBULIN 10 G: 50 SOLUTION INTRAVENOUS at 02:31

## 2017-06-29 RX ADMIN — METHYLPREDNISOLONE SODIUM SUCCINATE 125 MG: 125 INJECTION, POWDER, FOR SOLUTION INTRAMUSCULAR; INTRAVENOUS at 08:17

## 2017-06-29 RX ADMIN — IMMUNE GLOBULIN 10 G: 50 SOLUTION INTRAVENOUS at 04:16

## 2017-06-29 RX ADMIN — IMMUNE GLOBULIN 10 G: 50 SOLUTION INTRAVENOUS at 00:00

## 2017-06-29 ASSESSMENT — PAIN SCALES - GENERAL: PAINLEVEL_OUTOF10: 0

## 2017-06-29 NOTE — PROGRESS NOTES
Dr Crow and Dr Herrera notified of platelets improved to 20. Order received for discharge. Pt provided with all discharge instructions including home medications, follow up appointments and when to seek medical attention. All questions answered. PIV to L AC dc'd. Pt tolerated well. Off unit via wheelchair with CNA for discharge home.

## 2017-06-29 NOTE — H&P
HOSPITAL MEDICINE HISTORY/ PHYSICAL    Date & Time note created:    6/28/2017   11:12 PM       Patient ID:   Name: Pily Anderson YOB: 1960. Age: 56 y.o. female. MRN: [id number]    Admitting Attending:  Alex Oconnor     PCP : Brenda Davis PA-C    Outpatient Hematology, Dr. Coronado        Chief Complaint:       Thrombocytopenia    History of Present Illness:    Justin is a 56 y.o. female w/h/o recent diagnosis of ITP who presents with repeat drop in platelet count down below 10.  The patient was recently admitted with a platelet count down to 1 where she presented with petechia, the patient underwent steroid treatment as well as IVIG, and she improved significantly after treatment. The patient was followed up by hematology and again was found with a platelet count down to the single digits. The patient was referred to the emergency room for admission and additional treatment. The patient denies any bleeding or skin changes. She denies any medication use or over-the-counter medication use.    Review of Systems:    Has no particular complaints, she denies bleeding complications or skin lesions to suggest bruising.  Please see HPI, all other systems were reviewed and are negative (AMA/CMS criteria)              Past Medical/ Family / Social history (PFSH):   -Recent diagnosis of ITP    Current Outpatient Medications: None  Medication Allergy/Sensitivities: None  No Known Allergies  Family History:  Family History   Problem Relation Age of Onset   • Alzheimer's Disease Paternal Grandmother    • Cancer Neg Hx    • Diabetes Neg Hx    • Heart Disease Neg Hx    • Stroke Neg Hx       Social History:  Social History   Substance Use Topics   • Smoking status: Never Smoker    • Smokeless tobacco: Never Used   • Alcohol Use: Yes      Comment: 2 glasses of wine weekly.      #################################################################  Physical Exam:   Vitals/ General Appearance:   Weight/BMI: Body  "mass index is 24.17 kg/(m^2).  Blood pressure 130/73, pulse 84, temperature 36.2 °C (97.1 °F), resp. rate 18, height 1.676 m (5' 5.98\"), weight 67.9 kg (149 lb 11.1 oz), SpO2 97 %, not currently breastfeeding.   Filed Vitals:    06/28/17 1930 06/28/17 2000 06/28/17 2030 06/28/17 2100   BP:    130/73   Pulse: 75 76 78 84   Temp:    36.2 °C (97.1 °F)   Resp:    18   Height:    1.676 m (5' 5.98\")   Weight:    67.9 kg (149 lb 11.1 oz)   SpO2: 93% 95% 95% 97%    Oxygen Therapy:  Pulse Oximetry: 97 %, O2 (LPM): 0, O2 Delivery: None (Room Air)    Constitutional:  well developed, well nourished, non-toxic, no acute distress  HENMT: Normocephalic, atraumatic, b/l ears normal, nose normal  Eyes:  EOMI, conjunctiva normal, no discharge  Neck: no tracheal deviation, supple  Cardiovascular: normal heart rate, normal rhythm, no murmurs, no rubs or gallops; no cyanosis, clubbing or edema  Lungs: Respiratory effort is normal, normal breath sounds, breath sounds clear to auscultation b/l, no rales, rhonchi or wheezing  Abdomen: soft, non-tender, no guarding or rebound  Skin: warm, dry, no erythema, no rash  Neurologic: Alert and oriented, strength 5/5  , no focal deficits, CN II-XII normal  Psychiatric: No anxiety or depression    #################################################################  Lab Data Review:    Objective  Recent Results (from the past 24 hour(s))   CBC WITH DIFFERENTIAL    Collection Time: 06/28/17  5:22 PM   Result Value Ref Range    WBC 3.7 (L) 4.8 - 10.8 K/uL    RBC 4.77 4.20 - 5.40 M/uL    Hemoglobin 13.6 12.0 - 16.0 g/dL    Hematocrit 41.5 37.0 - 47.0 %    MCV 87.0 81.4 - 97.8 fL    MCH 28.5 27.0 - 33.0 pg    MCHC 32.8 (L) 33.6 - 35.0 g/dL    RDW 40.6 35.9 - 50.0 fL    Platelet Count 6 (LL) 164 - 446 K/uL    Nucleated RBC 0.00 /100 WBC    NRBC (Absolute) 0.00 K/uL    Neutrophils-Polys 59.10 44.00 - 72.00 %    Lymphocytes 27.80 22.00 - 41.00 %    Monocytes 2.60 0.00 - 13.40 %    Eosinophils 4.40 0.00 - 6.90 " %    Basophils 0.90 0.00 - 1.80 %    Neutrophils (Absolute) 2.35 2.00 - 7.15 K/uL    Lymphs (Absolute) 1.03 1.00 - 4.80 K/uL    Monos (Absolute) 0.10 0.00 - 0.85 K/uL    Eos (Absolute) 0.16 0.00 - 0.51 K/uL    Baso (Absolute) 0.03 0.00 - 0.12 K/uL   BASIC METABOLIC PANEL    Collection Time: 06/28/17  5:22 PM   Result Value Ref Range    Sodium 141 135 - 145 mmol/L    Potassium 3.7 3.6 - 5.5 mmol/L    Chloride 108 96 - 112 mmol/L    Co2 25 20 - 33 mmol/L    Glucose 96 65 - 99 mg/dL    Bun 17 8 - 22 mg/dL    Creatinine 0.75 0.50 - 1.40 mg/dL    Calcium 9.5 8.5 - 10.5 mg/dL    Anion Gap 8.0 0.0 - 11.9   PROTHROMBIN TIME    Collection Time: 06/28/17  5:22 PM   Result Value Ref Range    PT 12.9 12.0 - 14.6 sec    INR 0.94 0.87 - 1.13   COD (ADULT)    Collection Time: 06/28/17  5:22 PM   Result Value Ref Range    ABO Grouping Only B     Rh Grouping Only NEG     Antibody Screen-Cod NEG    ESTIMATED GFR    Collection Time: 06/28/17  5:22 PM   Result Value Ref Range    GFR If African American >60 >60 mL/min/1.73 m 2    GFR If Non African American >60 >60 mL/min/1.73 m 2   DIFFERENTIAL MANUAL    Collection Time: 06/28/17  5:22 PM   Result Value Ref Range    Bands-Stabs 4.30 0.00 - 10.00 %    Metamyelocytes 0.90 %    Manual Diff Status PERFORMED    PERIPHERAL SMEAR REVIEW    Collection Time: 06/28/17  5:22 PM   Result Value Ref Range    Peripheral Smear Review see below    PLATELET ESTIMATE    Collection Time: 06/28/17  5:22 PM   Result Value Ref Range    Plt Estimation Marked Decrease    MORPHOLOGY    Collection Time: 06/28/17  5:22 PM   Result Value Ref Range    RBC Morphology Present     Giant Platelets 1+     Reactive Lymphocytes Few    IMMATURE PLT FRACTION    Collection Time: 06/28/17  5:22 PM   Result Value Ref Range    Imm. Plt Fraction 24.4 (H) 0.6 - 13.1 K/uL   LDH    Collection Time: 06/28/17  5:22 PM   Result Value Ref Range    LDH Total 159 107 - 266 U/L       (click the triangle to expand results)  My  interpretation of lab results:     Imaging/Procedures Review:    No orders to display     ormal Sinus Rhythm, no ST/T changes    Assessment and Plan:      1. Recurrent severe thrombocytopenia, consistent with ITP. The patient had very good response on her recent treatment with steroids and IVIG, the case was discussed with Dr. Coronado and he recommends repeat treatment with dual modality. The patient will be admitted to oncology for further treatment and surveillance. The patient does not exhibit any bleeding at this time.  2. Mild normocytic anemia   3. Prophylaxis: SCDs  4. Code: Full code   5. Dispo: Pily will be admitted to inpatient for management that is expected to take greater than 2 midnights, patient is at high risk for complications and bleeding.

## 2017-06-29 NOTE — PROGRESS NOTES
Tech from Lab called with critical result of platelets of 5. Critical lab result read back to Tech.   This critical lab result is within parameters established by  for this patient

## 2017-06-29 NOTE — DISCHARGE INSTRUCTIONS
Thrombocytopenia  Thrombocytopenia is a condition in which there is an abnormally small number of platelets in your blood. Platelets are also called thrombocytes. Platelets are needed for blood clotting.  CAUSES  Thrombocytopenia is caused by:   · Decreased production of platelets. This can be caused by:  ¨ Aplastic anemia in which your bone marrow quits making blood cells.  ¨ Cancer in the bone marrow.  ¨ Use of certain medicines, including chemotherapy.  ¨ Infection in the bone marrow.  ¨ Heavy alcohol consumption.  · Increased destruction of platelets. This can be caused by:  ¨ Certain immune diseases.  ¨ Use of certain drugs.  ¨ Certain blood clotting disorders.  ¨ Certain inherited disorders.  ¨ Certain bleeding disorders.  ¨ Pregnancy.  · Having an enlarged spleen (hypersplenism). In hypersplenism, the spleen gathers up platelets from circulation. This means the platelets are not available to help with blood clotting. The spleen can enlarge due to cirrhosis or other conditions.  SYMPTOMS   The symptoms of thrombocytopenia are side effects of poor blood clotting. Some of these are:  1. Abnormal bleeding.  2. Nosebleeds.  3. Heavy menstrual periods.  4. Blood in the urine or stools.  5. Purpura. This is a purplish discoloration in the skin produced by small bleeding vessels near the surface of the skin.  6. Bruising.  7. A rash that may be petechial. This looks like pinpoint, purplish-red spots on the skin and mucous membranes. It is caused by bleeding from small blood vessels (capillaries).  DIAGNOSIS   Your caregiver will make this diagnosis based on your exam and blood tests. Sometimes, a bone marrow study is done to look for the original cells (megakaryocytes) that make platelets.  TREATMENT   Treatment depends on the cause of the condition.  1. Medicines may be given to help protect your platelets from being destroyed.  2. In some cases, a replacement (transfusion) of platelets may be required to stop or  prevent bleeding.  3. Sometimes, the spleen must be surgically removed.  HOME CARE INSTRUCTIONS   1. Check the skin and linings inside your mouth for bruising or bleeding as directed by your caregiver.  2. Check your sputum, urine, and stool for blood as directed by your caregiver.  3. Do not return to any activities that could cause bumps or bruises until your caregiver says it is okay.  4. Take extra care not to cut yourself when shaving or when using scissors, needles, knives, and other tools.  5. Take extra care not to burn yourself when ironing or cooking.  6. Ask your caregiver if it is okay for you to drink alcohol.  7. Only take over-the-counter or prescription medicines as directed by your caregiver.  8. Notify all your caregivers, including dentists and eye doctors, about your condition.  SEEK IMMEDIATE MEDICAL CARE IF:   1. You develop active bleeding from anywhere in your body.  2. You develop unexplained bruising or bleeding.  3. You have blood in your sputum, urine, or stool.  MAKE SURE YOU:  · Understand these instructions.  · Will watch your condition.  · Will get help right away if you are not doing well or get worse.     This information is not intended to replace advice given to you by your health care provider. Make sure you discuss any questions you have with your health care provider.     Document Released: 12/18/2006 Document Revised: 03/11/2013 Document Reviewed: 10/19/2012  [employer] Patient Education ©2016 [employer]    Discharge Instructions    Discharged to home by car with relative. Discharged via walking, hospital escort: Refused.  Special equipment needed: Not Applicable    Be sure to schedule a follow-up appointment with your primary care doctor or any specialists as instructed.     Discharge Plan:   Diet Plan: Discussed  Activity Level: Discussed  Confirmed Follow up Appointment: Patient to Call and Schedule Appointment  Confirmed Symptoms Management:  Discussed  Medication Reconciliation Updated: Yes  Influenza Vaccine Indication: Patient Refuses    I understand that a diet low in cholesterol, fat, and sodium is recommended for good health. Unless I have been given specific instructions below for another diet, I accept this instruction as my diet prescription.   Other diet: Regular as tolerated    Special Instructions: None    · Is patient discharged on Warfarin / Coumadin?   No     · Is patient Post Blood Transfusion?  No    Depression / Suicide Risk    As you are discharged from this [facility] facility, it is important to learn how to keep safe from harming yourself.    Recognize the warning signs:  · Abrupt changes in personality, positive or negative- including increase in energy   · Giving away possessions  · Change in eating patterns- significant weight changes-  positive or negative  · Change in sleeping patterns- unable to sleep or sleeping all the time   · Unwillingness or inability to communicate  · Depression  · Unusual sadness, discouragement and loneliness  · Talk of wanting to die  · Neglect of personal appearance   · Rebelliousness- reckless behavior  · Withdrawal from people/activities they love  · Confusion- inability to concentrate     If you or a loved one observes any of these behaviors or has concerns about self-harm, here's what you can do:  · Talk about it- your feelings and reasons for harming yourself  · Remove any means that you might use to hurt yourself (examples: pills, rope, extension cords, firearm)  · Get professional help from the community (Mental Health, Substance Abuse, psychological counseling)  · Do not be alone:Call your Safe Contact- someone whom you trust who will be there for you.  · Call your local CRISIS HOTLINE 293-7723 or 763-951-2481  · Call your local Children's Mobile Crisis Response Team [place] (256) 824-1959 or www.[employer]  · Call the toll free National Suicide Prevention Hotlines   · National  Suicide Prevention Lifeline 439-057-FNTE (0795)  · National [place] Line Network 800-SUICIDE (179-7108)

## 2017-06-29 NOTE — DISCHARGE SUMMARY
Hospital Medicine Discharge Note     Admit Date:  6/28/2017       Discharge Date:   6/29/2017    Attending Physician:  Radha Crow     Diagnoses (includes active and resolved):       Acute ITP ([facility]) POA: Unknown   hyperglycemia    Chief Complain  Thrombocytopenia     Hosiery of Present Illness  Justin is a 56 y.o. female w/h/o recent diagnosis of ITP who presents with repeat drop in platelet count down below 10.  Detailed info pls see H&P by Dr. Oconnor    [facility] Summary (Brief Narrative):       Pily Anderson is a very pleasant 56 y.o. female who was admitted 6/28/2017 for thrombocytopenia and   platelet below 10. Patient had ITP before and was treated with high-dose steroids, IVIG. This time patient's   hematologist also recommend patient to be treated in hospital with high-dose steroids and IVIG. Patient responded   to treatment very well. After one time IVIG and IV Solu-Medrol, patient's breathing improved to 20. Patient is   stable and wants to go home. Patient's hematologist agreed. She will need to see her hematologist next Monday.   Patient currently stable. She will be discharged home with 4 days of 40mg of decadron and stop.       Consultants:          Procedures:        none    Discharge Medications:        (x)  Medication Reconciliation Completed       Medication List      START taking these medications       Instructions    dexamethasone 4 MG Tabs   Commonly known as:  DECADRON    Take 10 Tabs by mouth every day for 4 days.   Dose:  40 mg       omeprazole 20 MG delayed-release capsule   Commonly known as:  PRILOSEC    Take 1 Cap by mouth every day for 5 days.   Dose:  20 mg             Disposition:   Discharge home    Diet:   Regular    Activity:   As tolerated    Code status:   Full code    Primary Care Provider:    Brenda Davis PA-C    Follow up appointment details :      PCP in 2 weeks  No follow-up provider specified.  Future Appointments  Date Time Provider Department Center  "  7/5/2017 3:00 PM RN 4 [facility]   7/6/2017 10:00 AM RN 1 [facility]       Pending Studies:        None    Time spent on discharge day patient visit: >35 minutes    Patient was initially admitted as inpatient, however patient recovered significantly and unexpectedly quick and patient currently stable enough to be discharged. Patient's hospital stay is unexpectedly shorter than two days.      #################################################    Interval history/exam for day of discharge:    Filed Vitals:    06/28/17 2030 06/28/17 2100 06/29/17 0400 06/29/17 0800   BP:  130/73 104/62 117/66   Pulse: 78 84 81 79   Temp:  36.2 °C (97.1 °F) 36.4 °C (97.6 °F) 37.1 °C (98.8 °F)   Resp:  18 16 18   Height:  1.676 m (5' 5.98\")     Weight:  67.9 kg (149 lb 11.1 oz)     SpO2: 95% 97% 98% 96%     Weight/BMI: Body mass index is 24.17 kg/(m^2).  Pulse Oximetry: 96 %, O2 (LPM): 0, O2 Delivery: None (Room Air)    Gen: AAOx3, NAD  Eyes: PELLA  Neck: no JVD, no lymphadenopathy  Cardia: RRR, no mrg  Lungs: CTAB, no rales, rhonci or wheezing  Abd: NABS, soft, non extended, no mass  EXT: No C/C/E, peripheral pulse 2+ b/l  Neuro: CN II-XII intact, non focal, reflex 2+ symmetrical  Skin: Intact, no lesion, warm  Psych: Appropriate.    Most Recent Labs:    Lab Results   Component Value Date/Time    WBC 6.6 06/29/2017 09:31 AM    RBC 4.82 06/29/2017 09:31 AM    HEMOGLOBIN 13.8 06/29/2017 09:31 AM    HEMATOCRIT 41.8 06/29/2017 09:31 AM    MCV 86.7 06/29/2017 09:31 AM    MCH 28.6 06/29/2017 09:31 AM    MCHC 33.0* 06/29/2017 09:31 AM    MPV 13.8* 06/29/2017 09:31 AM    NEUTROPHILS-POLYS 87.30* 06/29/2017 09:31 AM    LYMPHOCYTES 7.00* 06/29/2017 09:31 AM    MONOCYTES 4.90 06/29/2017 09:31 AM    EOSINOPHILS 0.00 06/29/2017 09:31 AM    BASOPHILS 0.30 06/29/2017 09:31 AM      Lab Results   Component Value Date/Time    SODIUM 136 06/28/2017 11:35 PM    POTASSIUM 3.7 06/28/2017 11:35 PM    CHLORIDE 104 06/28/2017 11:35 PM    CO2 25 " 06/28/2017 11:35 PM    GLUCOSE 254* 06/28/2017 11:35 PM    BUN 19 06/28/2017 11:35 PM    CREATININE 0.96 06/28/2017 11:35 PM    BUN-CREATININE RATIO 24* 06/01/2017 09:16 AM      Lab Results   Component Value Date/Time    ALT(SGPT) 7 06/04/2017 08:23 AM    AST(SGOT) 10* 06/04/2017 08:23 AM    ALKALINE PHOSPHATASE 62 06/04/2017 08:23 AM    TOTAL BILIRUBIN 0.6 06/04/2017 08:23 AM    ALBUMIN 3.8 06/04/2017 08:23 AM    GLOBULIN 4.5* 06/04/2017 08:23 AM    INR 0.94 06/28/2017 05:22 PM     Lab Results   Component Value Date/Time    PT 12.9 06/28/2017 05:22 PM    INR 0.94 06/28/2017 05:22 PM        Imaging/ Testing:      No orders to display       Instructions:      The patient was instructed to return to the ER in the event of worsening symptoms. I have counseled the patient on the importance of compliance and the patient has agreed to proceed with all medical recommendations and follow up plan indicated above.   The patient understands that all medications come with benefits and risks. Risks may include permanent injury or death and these risks can be minimized with close reassessment and monitoring.

## 2017-06-29 NOTE — ED NOTES
Pt denies taking any OTC or prescription medications currently  Allergies reviewed - NKDA  No ABX in last month

## 2017-06-29 NOTE — ED PROVIDER NOTES
"CHIEF COMPLAINT  Chief Complaint   Patient presents with   • Abnormal Labs     low platelets       HPI  Pily Anderson is a 56 y.o. female who presents to emergency room today with low platelet count/abnormal labs. Patient was seen by her hematologist Dr. Coronado who had repeat platelet count done in the office today was 7 and she was sent to the emergency room. Initially seen here 6/2 diagnosed with thrombocytopenia secondary possible ITP at that time was given doses of immunoglobulin therapy as well as placed on steroids her platelet count had improved but now has come down again. She notes some petechiae to her lower extremities and no active bleeding. No chest pain or shortness of breath no fever or chills    REVIEW OF SYSTEMS  See HPI for further details. All other systems are negative.     PAST MEDICAL HISTORY  History reviewed. No pertinent past medical history.    FAMILY HISTORY  [unfilled]    SOCIAL HISTORY  Social History     Social History   • Marital Status: Single     Spouse Name: N/A   • Number of Children: N/A   • Years of Education: N/A     Social History Main Topics   • Smoking status: Never Smoker    • Smokeless tobacco: Never Used   • Alcohol Use: Yes      Comment: 2 glasses of wine weekly.    • Drug Use: No   • Sexual Activity: Not Currently     Other Topics Concern   • None     Social History Narrative       SURGICAL HISTORY  History reviewed. No pertinent past surgical history.    CURRENT MEDICATIONS  Home Medications     Reviewed by Edwina Cintron (Pharmacy Tech) on 06/28/17 at 5611  Med List Status: Complete    Medication Last Dose Status          Patient John Taking any Medications                        ALLERGIES  No Known Allergies    PHYSICAL EXAM  VITAL SIGNS: /73 mmHg  Pulse 84  Temp(Src) 36.2 °C (97.1 °F)  Resp 18  Ht 1.676 m (5' 5.98\")  Wt 67.9 kg (149 lb 11.1 oz)  BMI 24.17 kg/m2  SpO2 97%  Breastfeeding? No Room air O2: 96    Constitutional: Well developed, " Well nourished, No acute distress, Non-toxic appearance.   HENT: Normocephalic, Atraumatic, Bilateral external ears normal, Oropharynx moist, No oral exudates, Nose normal.   Eyes: PERRLA, EOMI, Conjunctiva normal, No discharge.   Neck: Normal range of motion, No tenderness, Supple, No stridor.   Lymphatic: No lymphadenopathy noted.   Cardiovascular: Normal heart rate, Normal rhythm, No murmurs, No rubs, No gallops.   Thorax & Lungs: Normal breath sounds, No respiratory distress, No wheezing, No chest tenderness.   Abdomen: Bowel sounds normal, Soft, No tenderness, No masses, No pulsatile masses.   Skin: Warm, Dry, No erythema, No rash. Petechiae to the lower extremities  Back: No tenderness, No CVA tenderness.   Extremities: Intact distal pulses, No edema, No tenderness, No cyanosis, No clubbing.   Musculoskeletal: Good range of motion in all major joints. No tenderness to palpation or major deformities noted.   Neurologic: Alert & oriented x 3, Normal motor function, Normal sensory function, No focal deficits noted.   Psychiatric: Affect normal, Judgment normal, Mood normal    COURSE & MEDICAL DECISION MAKING  Pertinent Labs & Imaging studies reviewed. (See chart for details)  Platelet count is diminished, discussed case with Dr. Coronado's partner on-call patient to receive Solu-Medrol 125 mg IV push every 12 hours and will get immunoglobulin therapy. Discussed case with hospitalist for admission. Patient aware of the above will be admitted to the hospital    FINAL IMPRESSION  1. Acute thrombocytopenia  2.   3.         Electronically signed by: Deangelo Maher, 6/29/2017 12:58 AM

## 2017-06-29 NOTE — PROGRESS NOTES
Pt AAOx4. No complaints of pain. Pt stating that ordered steroid gives her a headache and makes her flushed. Educated pt on use of steroid for her condition. Pt agreeable to take medication. Pt refusing finger stick stating that her ordered steroid increases her blood sugar. Spoke to MD about diabetic diet and getting this changed to regular. Pt stating that she had a BM yesterday and that it was normal for her. Pt denies any pain or bleeding with urination. Pt steady and getting upself at this time. Call light within reach, treaded socks on pt. Hourly rounding in place.

## 2017-06-29 NOTE — PROGRESS NOTES
Pt complaining of leg cramping, which is not baseline for her.  Discussed with pharmacy regarding IVIG transfusion  Agreed to lower rate to 144mL/hr for pt tolerability.  Will continue to monitor

## 2017-06-29 NOTE — ED NOTES
Pt resting on stretcher, visible rise and fall of chest. Call to [facility] ICU, room being cleaned

## 2017-06-29 NOTE — CONSULTS
Consult Note: Hematology    Date of consultation: 6/29/2017 8:39 AM    Referring provider: Alex Oconnor M.D    Reason for consultation: Refractory ITP    History of presenting illness:     Pily Anderson   is a 56 y.o. year old patient of Dr. Coronado who was admitted a few weeks ago with ITP. She had good response to IVIG and was discharged home on pulse dexamethasone. She was found to have low platelet count. The clinic and was admitted for IVIG. She received Solu-Medrol and overnight IVIG. Platelet count this morning, came up to 20. She is grossly asymptomatic. She wants to go home. She is refusing splenectomy or rituximab.      PAST MEDICAL HISTORY:  Is pertinent for no surgeries.  She has no past medical   history except for the questionable history of multiple sclerosis back in    2001, which really did not pan out.  She takes no prescription drugs, but does   frequently take ibuprofen 600 mg a day for back pain when she is working.     Risk factors, she does not smoke.  She occasionally drinks alcohol.    SOCIAL HISTORY:  She is not .  She does have one child.    FAMILY HISTORY:  Noncontributory.    ALLERGIES:  She has no known allergies.  Allergies:  Review of patient's allergies indicates no known allergies.    Medications:    Current Facility-Administered Medications   Medication Dose Route Frequency Provider Last Rate Last Dose   • insulin lispro (HUMALOG) injection 1-6 Units  1-6 Units Subcutaneous Q6HRS Radha Crow M.D.   1 Units at 06/29/17 0817   • immune globulin (OCTAGAM) 5% (10 g/200mL infusion 60 g  60 g Intravenous Once Radha Crow M.D.       • NS infusion   Intravenous Continuous Alex Oconnor M.D. 83 mL/hr at 06/28/17 2132     • ondansetron (ZOFRAN) syringe/vial injection 4 mg  4 mg Intravenous Q4HRS PRN Alex Oconnor M.D.       • ondansetron (ZOFRAN ODT) dispertab 4 mg  4 mg Oral Q4HRS PRN Alex Oconnor M.D.       • promethazine (PHENERGAN) tablet 12.5-25 mg   12.5-25 mg Oral Q4HRS PRN Alex Oconnor M.D.       • promethazine (PHENERGAN) suppository 12.5-25 mg  12.5-25 mg Rectal Q4HRS PRN Alex Oconnor M.D.       • prochlorperazine (COMPAZINE) injection 5-10 mg  5-10 mg Intravenous Q4HRS PRN Alex Oconnor M.D.       • methylPREDNISolone sod succ (SOLU-MEDROL) 125 MG injection 125 mg  125 mg Intravenous Q12HRS Alex Oconnor M.D.   125 mg at 06/29/17 0817       Social History:     Social History     Social History   • Marital Status: Single     Spouse Name: N/A   • Number of Children: N/A   • Years of Education: N/A     Occupational History   • Not on file.     Social History Main Topics   • Smoking status: Never Smoker    • Smokeless tobacco: Never Used   • Alcohol Use: Yes      Comment: 2 glasses of wine weekly.    • Drug Use: No   • Sexual Activity: Not Currently     Other Topics Concern   • Not on file     Social History Narrative       Family History:     Family History   Problem Relation Age of Onset   • Alzheimer's Disease Paternal Grandmother    • Cancer Neg Hx    • Diabetes Neg Hx    • Heart Disease Neg Hx    • Stroke Neg Hx        Review of Systems:  All other review of systems are negative except what was mentioned above in the HPI.    Constitutional: No fever, chills, weight loss ,malaise/fatigue.    HEENT: No new auditory or visual complaints. No sore throat and neck pain.     Respiratory:No new cough, sputum production, shortness of breath and wheezing.    Cardiovascular: No new chest pain, palpitations, orthopnea and leg swelling.    Gastrointestinal: No heartburn, nausea, vomiting ,abdominal pain, hematochezia or melena     Genitourinary: Negative for dysuria, hematuria    Musculoskeletal: No new arthralgias or myalgias   Skin: Negative for rash and itching.    Neurological: Negative for focal weakness or headaches.    Endo/Heme/Allergies: No abnormal bleed/bruise.    Psychiatric/Behavioral: No new depression/anxiety.    Physical  "Exam:  Vitals:   /66 mmHg  Pulse 79  Temp(Src) 37.1 °C (98.8 °F)  Resp 18  Ht 1.676 m (5' 5.98\")  Wt 67.9 kg (149 lb 11.1 oz)  BMI 24.17 kg/m2  SpO2 96%  Breastfeeding? No    General: Not in acute distress, alert and oriented x 3  HEENT: No pallor, icterus. Oropharynx clear.   Neck: Supple, no palpable masses.  Lymph nodes: No palpable cervical, supraclavicular, axillary or inguinal lymphadenopathy.    CVS: regular rate and rhythm, no rubs or gallops  RESP: Clear to auscultate bilaterally, no wheezing or crackles.   ABD: Soft, non tender, non distended, positive bowel sounds, no palpable organomegaly  EXT: No edema or cyanosis  CNS: Alert and oriented x3, No focal deficits.  Skin- No rash      Labs:   Recent Labs      06/28/17   1722  06/28/17   2335   RBC  4.77  4.83   HEMOGLOBIN  13.6  13.8   HEMATOCRIT  41.5  42.0   PLATELETCT  6*  5*   PROTHROMBTM  12.9   --    INR  0.94   --      Lab Results   Component Value Date/Time    SODIUM 136 06/28/2017 11:35 PM    POTASSIUM 3.7 06/28/2017 11:35 PM    CHLORIDE 104 06/28/2017 11:35 PM    CO2 25 06/28/2017 11:35 PM    GLUCOSE 254* 06/28/2017 11:35 PM    BUN 19 06/28/2017 11:35 PM    CREATININE 0.96 06/28/2017 11:35 PM    BUN-CREATININE RATIO 24* 06/01/2017 09:16 AM        Assessment and Plan:  Refractory ITP - steroid sensitive, Failed pulse Dex  earlier this month. Patient has reluctance to take steroids. Alternate options include splenectomy, Rituxan, Nplate, Promacta . However, she is adamantly against splenectomy or rituximab. She is planning on trying Chinese herbals and papaya leaf for her low platelets.    She can be discharged home on pulse dexamethasone 40 mg daily for 4 days. We will inform Dr. Coronado about her decision. She needs to have repeat CBC done next Monday in the clinic and follow-up with Dr. Coronado.    She agreed and verbalized her agreement and understanding with the current plan.  I answered all questions and concerns she has at this " time.              Thank you for allowing me to participate in her care.    Please note that this dictation was created using voice recognition software. I have made every reasonable attempt to correct obvious errors, but I expect that there are errors of grammar and possibly content that I did not discover before finalizing the note.      SIGNATURES:  Emmett Herrera    CC:  Brenda Davis PA-C  No ref. provider found

## 2017-06-29 NOTE — PROGRESS NOTES
Pt to R-301  Oriented to room  Belongings at bedside  Admit profile complete  Pt is up self  PIV  Pt wishes to be home by Friday  Denies pain  No further needs

## 2017-06-29 NOTE — PROGRESS NOTES
Tania from Lab called with critical result of platelets of 20 at 0956. Critical lab result read back to Tania.   Dr. Crow notified of critical lab result at 1040.  Critical lab result read back by Dr. Crow.

## 2017-06-29 NOTE — CARE PLAN
Problem: Safety  Goal: Will remain free from injury  Outcome: PROGRESSING AS EXPECTED  Pt is up self  Educated on call light  Refusing non-skid socks

## 2017-07-06 ENCOUNTER — APPOINTMENT (OUTPATIENT)
Dept: ONCOLOGY | Facility: MEDICAL CENTER | Age: 57
End: 2017-07-06
Attending: SPECIALIST
Payer: COMMERCIAL

## 2017-07-12 ENCOUNTER — APPOINTMENT (OUTPATIENT)
Dept: ONCOLOGY | Facility: MEDICAL CENTER | Age: 57
End: 2017-07-12
Attending: SPECIALIST
Payer: COMMERCIAL

## 2017-07-19 ENCOUNTER — APPOINTMENT (OUTPATIENT)
Dept: ONCOLOGY | Facility: MEDICAL CENTER | Age: 57
End: 2017-07-19
Attending: SPECIALIST
Payer: COMMERCIAL

## 2017-07-26 ENCOUNTER — HOSPITAL ENCOUNTER (OUTPATIENT)
Facility: MEDICAL CENTER | Age: 57
End: 2017-07-26
Attending: SPECIALIST
Payer: COMMERCIAL

## 2017-07-26 ENCOUNTER — APPOINTMENT (OUTPATIENT)
Dept: ONCOLOGY | Facility: MEDICAL CENTER | Age: 57
End: 2017-07-26
Attending: SPECIALIST
Payer: COMMERCIAL

## 2017-07-26 LAB
BASOPHILS # BLD AUTO: 0.3 % (ref 0–1.8)
BASOPHILS # BLD: 0.02 K/UL (ref 0–0.12)
EOSINOPHIL # BLD AUTO: 0.08 K/UL (ref 0–0.51)
EOSINOPHIL NFR BLD: 1 % (ref 0–6.9)
ERYTHROCYTE [DISTWIDTH] IN BLOOD BY AUTOMATED COUNT: 46.8 FL (ref 35.9–50)
HCT VFR BLD AUTO: 47.1 % (ref 37–47)
HGB BLD-MCNC: 15.7 G/DL (ref 12–16)
IMM GRANULOCYTES # BLD AUTO: 0.15 K/UL (ref 0–0.11)
IMM GRANULOCYTES NFR BLD AUTO: 1.9 % (ref 0–0.9)
LYMPHOCYTES # BLD AUTO: 1.34 K/UL (ref 1–4.8)
LYMPHOCYTES NFR BLD: 17 % (ref 22–41)
MCH RBC QN AUTO: 29.4 PG (ref 27–33)
MCHC RBC AUTO-ENTMCNC: 33.3 G/DL (ref 33.6–35)
MCV RBC AUTO: 88.2 FL (ref 81.4–97.8)
MONOCYTES # BLD AUTO: 0.83 K/UL (ref 0–0.85)
MONOCYTES NFR BLD AUTO: 10.5 % (ref 0–13.4)
NEUTROPHILS # BLD AUTO: 5.46 K/UL (ref 2–7.15)
NEUTROPHILS NFR BLD: 69.3 % (ref 44–72)
NRBC # BLD AUTO: 0 K/UL
NRBC BLD AUTO-RTO: 0 /100 WBC
PLATELET # BLD AUTO: 171 K/UL (ref 164–446)
PMV BLD AUTO: 10.8 FL (ref 9–12.9)
RBC # BLD AUTO: 5.34 M/UL (ref 4.2–5.4)
WBC # BLD AUTO: 7.9 K/UL (ref 4.8–10.8)

## 2017-07-26 PROCEDURE — 85025 COMPLETE CBC W/AUTO DIFF WBC: CPT

## 2017-08-02 ENCOUNTER — APPOINTMENT (OUTPATIENT)
Dept: ONCOLOGY | Facility: MEDICAL CENTER | Age: 57
End: 2017-08-02
Attending: SPECIALIST
Payer: COMMERCIAL

## 2017-08-17 ENCOUNTER — HOSPITAL ENCOUNTER (OUTPATIENT)
Dept: RADIOLOGY | Facility: MEDICAL CENTER | Age: 57
End: 2017-08-17
Attending: SPECIALIST
Payer: COMMERCIAL

## 2017-08-17 DIAGNOSIS — D69.3 IMMUNE THROMBOCYTOPENIC PURPURA (HCC): ICD-10-CM

## 2017-08-17 PROCEDURE — 76700 US EXAM ABDOM COMPLETE: CPT

## 2017-10-26 NOTE — ADDENDUM NOTE
Encounter addended by: Magda Santo R.N. on: 10/26/2017  2:26 PM<BR>    Actions taken: Flowsheet accepted

## 2017-11-16 ENCOUNTER — APPOINTMENT (RX ONLY)
Dept: URBAN - METROPOLITAN AREA CLINIC 4 | Facility: CLINIC | Age: 57
Setting detail: DERMATOLOGY
End: 2017-11-16

## 2017-11-16 DIAGNOSIS — L82.0 INFLAMED SEBORRHEIC KERATOSIS: ICD-10-CM

## 2017-11-16 DIAGNOSIS — L82.1 OTHER SEBORRHEIC KERATOSIS: ICD-10-CM

## 2017-11-16 DIAGNOSIS — L81.4 OTHER MELANIN HYPERPIGMENTATION: ICD-10-CM

## 2017-11-16 DIAGNOSIS — L73.8 OTHER SPECIFIED FOLLICULAR DISORDERS: ICD-10-CM

## 2017-11-16 DIAGNOSIS — D22 MELANOCYTIC NEVI: ICD-10-CM

## 2017-11-16 PROBLEM — D22.61 MELANOCYTIC NEVI OF RIGHT UPPER LIMB, INCLUDING SHOULDER: Status: ACTIVE | Noted: 2017-11-16

## 2017-11-16 PROBLEM — L91.8 OTHER HYPERTROPHIC DISORDERS OF THE SKIN: Status: ACTIVE | Noted: 2017-11-16

## 2017-11-16 PROBLEM — D22.5 MELANOCYTIC NEVI OF TRUNK: Status: ACTIVE | Noted: 2017-11-16

## 2017-11-16 PROBLEM — D48.5 NEOPLASM OF UNCERTAIN BEHAVIOR OF SKIN: Status: ACTIVE | Noted: 2017-11-16

## 2017-11-16 PROBLEM — D22.71 MELANOCYTIC NEVI OF RIGHT LOWER LIMB, INCLUDING HIP: Status: ACTIVE | Noted: 2017-11-16

## 2017-11-16 PROBLEM — D22.62 MELANOCYTIC NEVI OF LEFT UPPER LIMB, INCLUDING SHOULDER: Status: ACTIVE | Noted: 2017-11-16

## 2017-11-16 PROBLEM — D22.72 MELANOCYTIC NEVI OF LEFT LOWER LIMB, INCLUDING HIP: Status: ACTIVE | Noted: 2017-11-16

## 2017-11-16 PROCEDURE — 99202 OFFICE O/P NEW SF 15 MIN: CPT | Mod: 25

## 2017-11-16 PROCEDURE — ? LIQUID NITROGEN

## 2017-11-16 PROCEDURE — 11100: CPT | Mod: 59

## 2017-11-16 PROCEDURE — ? COUNSELING

## 2017-11-16 PROCEDURE — ? BIOPSY BY SHAVE METHOD

## 2017-11-16 ASSESSMENT — LOCATION DETAILED DESCRIPTION DERM
LOCATION DETAILED: RIGHT PROXIMAL POSTERIOR THIGH
LOCATION DETAILED: INFERIOR THORACIC SPINE
LOCATION DETAILED: LEFT INFERIOR CENTRAL MALAR CHEEK
LOCATION DETAILED: LEFT PROXIMAL DORSAL FOREARM
LOCATION DETAILED: RIGHT INFERIOR MEDIAL UPPER BACK
LOCATION DETAILED: LEFT DISTAL POSTERIOR THIGH
LOCATION DETAILED: LEFT DISTAL MEDIAL POSTERIOR THIGH
LOCATION DETAILED: RIGHT RIB CAGE
LOCATION DETAILED: RIGHT MID-UPPER BACK
LOCATION DETAILED: LEFT DISTAL LATERAL POSTERIOR UPPER ARM
LOCATION DETAILED: RIGHT LATERAL ABDOMEN
LOCATION DETAILED: RIGHT PROXIMAL DORSAL FOREARM

## 2017-11-16 ASSESSMENT — LOCATION ZONE DERM
LOCATION ZONE: ARM
LOCATION ZONE: FACE
LOCATION ZONE: LEG
LOCATION ZONE: TRUNK

## 2017-11-16 ASSESSMENT — LOCATION SIMPLE DESCRIPTION DERM
LOCATION SIMPLE: LEFT POSTERIOR UPPER ARM
LOCATION SIMPLE: ABDOMEN
LOCATION SIMPLE: RIGHT POSTERIOR THIGH
LOCATION SIMPLE: UPPER BACK
LOCATION SIMPLE: LEFT CHEEK
LOCATION SIMPLE: RIGHT FOREARM
LOCATION SIMPLE: LEFT FOREARM
LOCATION SIMPLE: RIGHT UPPER BACK
LOCATION SIMPLE: LEFT POSTERIOR THIGH

## 2017-11-16 NOTE — PROCEDURE: BIOPSY BY SHAVE METHOD
Bill For Surgical Tray: no
Lab Facility: 
Anesthesia Volume In Cc: 1.5
Biopsy Method: Personna blade
Render Post-Care Instructions In Note?: yes
Detail Level: Detailed
Hemostasis: Drysol and Electrocautery
Electrodesiccation And Curettage Text: The wound bed was treated with electrodesiccation and curettage after the biopsy was performed.
Billing Type: Third-Party Bill
Consent: Written consent was obtained and risks were reviewed including but not limited to scarring, infection, bleeding, scabbing, incomplete removal, nerve damage and allergy to anesthesia.
Post-Care Instructions: I reviewed with the patient in detail post-care instructions. Patient is to keep the biopsy site dry overnight, and then apply vasaline twice daily until healed.
Wound Care: Vaseline
Biopsy Type: H and E
Curettage Text: The wound bed was treated with curettage after the biopsy was performed.
Lab: [id number]
Additional Anesthesia Volume In Cc (Will Not Render If 0): 0
Type Of Destruction Used: Curettage
Dressing: Band-Aid
Notification Instructions: Patient will be notified of biopsy results. However, patient instructed to call the office if not contacted within 2 weeks.
Anesthesia Type: 1% lidocaine with epinephrine and a 1:10 solution of 8.4% sodium bicarbonate
Electrodesiccation Text: The wound bed was treated with electrodesiccation after the biopsy was performed.

## 2017-11-16 NOTE — PROCEDURE: LIQUID NITROGEN
Post-Care Instructions: I reviewed with the patient in detail post-care instructions. Patient is to wear sunprotection, and avoid picking at any of the treated lesions. Pt may apply Vaseline to crusted or scabbing areas.
Medical Necessity Information: It is in your best interest to select a reason for this procedure from the list below. All of these items fulfill various CMS LCD requirements except the new and changing color options.
Include Z78.9 (Other Specified Conditions Influencing Health Status) As An Associated Diagnosis?: No
Consent: The patient's consent was obtained including but not limited to risks of crusting, scabbing, blistering, scarring, darker or lighter pigmentary change, recurrence, incomplete removal and infection.
Detail Level: Detailed
Medical Necessity Clause: This procedure was medically necessary because the lesions that were treated were:

## 2018-03-08 ENCOUNTER — RESOLUTE PROFESSIONAL BILLING HOSPITAL PROF FEE (OUTPATIENT)
Dept: HOSPITALIST | Facility: MEDICAL CENTER | Age: 58
End: 2018-03-08
Payer: COMMERCIAL

## 2018-03-08 ENCOUNTER — HOSPITAL ENCOUNTER (INPATIENT)
Facility: MEDICAL CENTER | Age: 58
LOS: 1 days | DRG: 813 | End: 2018-03-09
Attending: EMERGENCY MEDICINE | Admitting: INTERNAL MEDICINE
Payer: COMMERCIAL

## 2018-03-08 DIAGNOSIS — D69.3 ACUTE ITP (HCC): ICD-10-CM

## 2018-03-08 DIAGNOSIS — D69.6 THROMBOCYTOPENIA (HCC): ICD-10-CM

## 2018-03-08 LAB
ALBUMIN SERPL BCP-MCNC: 4.8 G/DL (ref 3.2–4.9)
ALBUMIN/GLOB SERPL: 1.8 G/DL
ALP SERPL-CCNC: 66 U/L (ref 30–99)
ALT SERPL-CCNC: 10 U/L (ref 2–50)
ANION GAP SERPL CALC-SCNC: 9 MMOL/L (ref 0–11.9)
APTT PPP: 26 SEC (ref 24.7–36)
AST SERPL-CCNC: 13 U/L (ref 12–45)
BASOPHILS # BLD AUTO: 0.8 % (ref 0–1.8)
BASOPHILS # BLD: 0.04 K/UL (ref 0–0.12)
BILIRUB SERPL-MCNC: 0.7 MG/DL (ref 0.1–1.5)
BUN SERPL-MCNC: 16 MG/DL (ref 8–22)
CALCIUM SERPL-MCNC: 10.1 MG/DL (ref 8.5–10.5)
CHLORIDE SERPL-SCNC: 105 MMOL/L (ref 96–112)
CO2 SERPL-SCNC: 26 MMOL/L (ref 20–33)
COMMENT 1642: NORMAL
CREAT SERPL-MCNC: 0.93 MG/DL (ref 0.5–1.4)
EOSINOPHIL # BLD AUTO: 0.13 K/UL (ref 0–0.51)
EOSINOPHIL NFR BLD: 2.5 % (ref 0–6.9)
ERYTHROCYTE [DISTWIDTH] IN BLOOD BY AUTOMATED COUNT: 40.8 FL (ref 35.9–50)
GLOBULIN SER CALC-MCNC: 2.7 G/DL (ref 1.9–3.5)
GLUCOSE SERPL-MCNC: 94 MG/DL (ref 65–99)
HCT VFR BLD AUTO: 47.7 % (ref 37–47)
HGB BLD-MCNC: 16 G/DL (ref 12–16)
IMM GRANULOCYTES # BLD AUTO: 0.02 K/UL (ref 0–0.11)
IMM GRANULOCYTES NFR BLD AUTO: 0.4 % (ref 0–0.9)
INR PPP: 0.94 (ref 0.87–1.13)
LYMPHOCYTES # BLD AUTO: 1.37 K/UL (ref 1–4.8)
LYMPHOCYTES NFR BLD: 26.7 % (ref 22–41)
MAGNESIUM SERPL-MCNC: 2.1 MG/DL (ref 1.5–2.5)
MCH RBC QN AUTO: 29.7 PG (ref 27–33)
MCHC RBC AUTO-ENTMCNC: 33.5 G/DL (ref 33.6–35)
MCV RBC AUTO: 88.5 FL (ref 81.4–97.8)
MONOCYTES # BLD AUTO: 0.39 K/UL (ref 0–0.85)
MONOCYTES NFR BLD AUTO: 7.6 % (ref 0–13.4)
MORPHOLOGY BLD-IMP: NORMAL
NEUTROPHILS # BLD AUTO: 3.19 K/UL (ref 2–7.15)
NEUTROPHILS NFR BLD: 62 % (ref 44–72)
NRBC # BLD AUTO: 0 K/UL
NRBC BLD-RTO: 0 /100 WBC
PLATELET # BLD AUTO: 5 K/UL (ref 164–446)
PLATELETS.RETICULATED NFR BLD AUTO: 23.3 K/UL (ref 0.6–13.1)
POTASSIUM SERPL-SCNC: 3.6 MMOL/L (ref 3.6–5.5)
PROT SERPL-MCNC: 7.5 G/DL (ref 6–8.2)
PROTHROMBIN TIME: 12.3 SEC (ref 12–14.6)
RBC # BLD AUTO: 5.39 M/UL (ref 4.2–5.4)
SODIUM SERPL-SCNC: 140 MMOL/L (ref 135–145)
WBC # BLD AUTO: 5.1 K/UL (ref 4.8–10.8)

## 2018-03-08 PROCEDURE — 83735 ASSAY OF MAGNESIUM: CPT

## 2018-03-08 PROCEDURE — 80053 COMPREHEN METABOLIC PANEL: CPT

## 2018-03-08 PROCEDURE — 99285 EMERGENCY DEPT VISIT HI MDM: CPT

## 2018-03-08 PROCEDURE — 85025 COMPLETE CBC W/AUTO DIFF WBC: CPT

## 2018-03-08 PROCEDURE — 30233S1 TRANSFUSION OF NONAUTOLOGOUS GLOBULIN INTO PERIPHERAL VEIN, PERCUTANEOUS APPROACH: ICD-10-PCS | Performed by: INTERNAL MEDICINE

## 2018-03-08 PROCEDURE — 85055 RETICULATED PLATELET ASSAY: CPT

## 2018-03-08 PROCEDURE — 700111 HCHG RX REV CODE 636 W/ 250 OVERRIDE (IP): Performed by: STUDENT IN AN ORGANIZED HEALTH CARE EDUCATION/TRAINING PROGRAM

## 2018-03-08 PROCEDURE — 85610 PROTHROMBIN TIME: CPT

## 2018-03-08 PROCEDURE — 96374 THER/PROPH/DIAG INJ IV PUSH: CPT

## 2018-03-08 PROCEDURE — 770006 HCHG ROOM/CARE - MED/SURG/GYN SEMI*

## 2018-03-08 PROCEDURE — 85730 THROMBOPLASTIN TIME PARTIAL: CPT

## 2018-03-08 PROCEDURE — 99223 1ST HOSP IP/OBS HIGH 75: CPT | Mod: GC | Performed by: INTERNAL MEDICINE

## 2018-03-08 PROCEDURE — 700111 HCHG RX REV CODE 636 W/ 250 OVERRIDE (IP): Performed by: EMERGENCY MEDICINE

## 2018-03-08 RX ORDER — POLYETHYLENE GLYCOL 3350 17 G/17G
1 POWDER, FOR SOLUTION ORAL
Status: DISCONTINUED | OUTPATIENT
Start: 2018-03-08 | End: 2018-03-09 | Stop reason: HOSPADM

## 2018-03-08 RX ORDER — AMOXICILLIN 250 MG
2 CAPSULE ORAL 2 TIMES DAILY
Status: DISCONTINUED | OUTPATIENT
Start: 2018-03-08 | End: 2018-03-09 | Stop reason: HOSPADM

## 2018-03-08 RX ORDER — ASCORBIC ACID 500 MG
500-1000 TABLET ORAL DAILY
COMMUNITY

## 2018-03-08 RX ORDER — ACETAMINOPHEN 325 MG/1
650 TABLET ORAL EVERY 6 HOURS PRN
Status: DISCONTINUED | OUTPATIENT
Start: 2018-03-08 | End: 2018-03-09 | Stop reason: HOSPADM

## 2018-03-08 RX ORDER — IMMUNE GLOBULIN 50 MG/ML
10 SOLUTION INTRAVENOUS ONCE
Status: DISCONTINUED | OUTPATIENT
Start: 2018-03-08 | End: 2018-03-08

## 2018-03-08 RX ORDER — IMMUNE GLOBULIN 50 MG/ML
10 SOLUTION INTRAVENOUS ONCE
Status: COMPLETED | OUTPATIENT
Start: 2018-03-08 | End: 2018-03-09

## 2018-03-08 RX ORDER — METHYLPREDNISOLONE SODIUM SUCCINATE 125 MG/2ML
125 INJECTION, POWDER, LYOPHILIZED, FOR SOLUTION INTRAMUSCULAR; INTRAVENOUS ONCE
Status: COMPLETED | OUTPATIENT
Start: 2018-03-08 | End: 2018-03-08

## 2018-03-08 RX ORDER — METHYLPREDNISOLONE SODIUM SUCCINATE 125 MG/2ML
125 INJECTION, POWDER, LYOPHILIZED, FOR SOLUTION INTRAMUSCULAR; INTRAVENOUS EVERY 12 HOURS
Status: DISCONTINUED | OUTPATIENT
Start: 2018-03-08 | End: 2018-03-09 | Stop reason: HOSPADM

## 2018-03-08 RX ORDER — IMMUNE GLOBULIN 50 MG/ML
10 SOLUTION INTRAVENOUS ONCE
Status: COMPLETED | OUTPATIENT
Start: 2018-03-08 | End: 2018-03-08

## 2018-03-08 RX ORDER — BISACODYL 10 MG
10 SUPPOSITORY, RECTAL RECTAL
Status: DISCONTINUED | OUTPATIENT
Start: 2018-03-08 | End: 2018-03-09 | Stop reason: HOSPADM

## 2018-03-08 RX ORDER — ASCORBIC ACID 500 MG
500-1000 TABLET ORAL DAILY
Status: DISCONTINUED | OUTPATIENT
Start: 2018-03-08 | End: 2018-03-08

## 2018-03-08 RX ADMIN — IMMUNE GLOBULIN 10 G: 50 SOLUTION INTRAVENOUS at 23:58

## 2018-03-08 RX ADMIN — IMMUNE GLOBULIN 10 G: 50 SOLUTION INTRAVENOUS at 21:37

## 2018-03-08 RX ADMIN — METHYLPREDNISOLONE SODIUM SUCCINATE 125 MG: 125 INJECTION, POWDER, FOR SOLUTION INTRAMUSCULAR; INTRAVENOUS at 17:33

## 2018-03-08 ASSESSMENT — ENCOUNTER SYMPTOMS
FEVER: 0
CONSTITUTIONAL NEGATIVE: 1
EYES NEGATIVE: 1
ABDOMINAL PAIN: 0
GASTROINTESTINAL NEGATIVE: 1
VOMITING: 0
BLURRED VISION: 0
TINGLING: 0
ROS SKIN COMMENTS: PETECHIA
HEARTBURN: 0
NEUROLOGICAL NEGATIVE: 1
CHILLS: 0
RESPIRATORY NEGATIVE: 1
WEAKNESS: 0
MUSCULOSKELETAL NEGATIVE: 1
SHORTNESS OF BREATH: 0
CARDIOVASCULAR NEGATIVE: 1
HEADACHES: 0
PALPITATIONS: 0
COUGH: 0
CONSTIPATION: 0
NAUSEA: 0
MYALGIAS: 0
DIZZINESS: 0
ABDOMINAL PAIN: 1
DIARRHEA: 0
SORE THROAT: 0

## 2018-03-08 ASSESSMENT — PAIN SCALES - GENERAL
PAINLEVEL_OUTOF10: 0

## 2018-03-08 NOTE — ED TRIAGE NOTES
"Chief Complaint   Patient presents with   • Abnormal Labs     pt was sent by MD for low platelet count.     Blood pressure 130/80, pulse 81, temperature 36.8 °C (98.2 °F), temperature source Temporal, resp. rate 16, height 1.676 m (5' 6\"), weight 70.7 kg (155 lb 13.8 oz), SpO2 99 %.    Pt informed of wait times. Educated on triage process.  Asked to return to triage RN for any new or worsening of symptoms. Thanked for patience.        "

## 2018-03-09 ENCOUNTER — PATIENT OUTREACH (OUTPATIENT)
Dept: HEALTH INFORMATION MANAGEMENT | Facility: OTHER | Age: 58
End: 2018-03-09

## 2018-03-09 VITALS
BODY MASS INDEX: 25.05 KG/M2 | RESPIRATION RATE: 17 BRPM | SYSTOLIC BLOOD PRESSURE: 124 MMHG | HEIGHT: 66 IN | TEMPERATURE: 97.8 F | OXYGEN SATURATION: 96 % | DIASTOLIC BLOOD PRESSURE: 72 MMHG | WEIGHT: 155.87 LBS | HEART RATE: 106 BPM

## 2018-03-09 LAB
ALBUMIN SERPL BCP-MCNC: 4.1 G/DL (ref 3.2–4.9)
ALBUMIN/GLOB SERPL: 1.2 G/DL
ALP SERPL-CCNC: 61 U/L (ref 30–99)
ALT SERPL-CCNC: 9 U/L (ref 2–50)
ANION GAP SERPL CALC-SCNC: 8 MMOL/L (ref 0–11.9)
AST SERPL-CCNC: 12 U/L (ref 12–45)
BASOPHILS # BLD AUTO: 0 % (ref 0–1.8)
BASOPHILS # BLD: 0 K/UL (ref 0–0.12)
BILIRUB SERPL-MCNC: 0.7 MG/DL (ref 0.1–1.5)
BUN SERPL-MCNC: 17 MG/DL (ref 8–22)
CALCIUM SERPL-MCNC: 9.8 MG/DL (ref 8.5–10.5)
CHLORIDE SERPL-SCNC: 103 MMOL/L (ref 96–112)
CO2 SERPL-SCNC: 23 MMOL/L (ref 20–33)
CREAT SERPL-MCNC: 0.91 MG/DL (ref 0.5–1.4)
EOSINOPHIL # BLD AUTO: 0 K/UL (ref 0–0.51)
EOSINOPHIL NFR BLD: 0 % (ref 0–6.9)
ERYTHROCYTE [DISTWIDTH] IN BLOOD BY AUTOMATED COUNT: 37.8 FL (ref 35.9–50)
ERYTHROCYTE [DISTWIDTH] IN BLOOD BY AUTOMATED COUNT: 38 FL (ref 35.9–50)
ERYTHROCYTE [DISTWIDTH] IN BLOOD BY AUTOMATED COUNT: 39.1 FL (ref 35.9–50)
GLOBULIN SER CALC-MCNC: 3.5 G/DL (ref 1.9–3.5)
GLUCOSE SERPL-MCNC: 230 MG/DL (ref 65–99)
HCT VFR BLD AUTO: 43.3 % (ref 37–47)
HCT VFR BLD AUTO: 44.4 % (ref 37–47)
HCT VFR BLD AUTO: 46.1 % (ref 37–47)
HGB BLD-MCNC: 14.7 G/DL (ref 12–16)
HGB BLD-MCNC: 15 G/DL (ref 12–16)
HGB BLD-MCNC: 15.3 G/DL (ref 12–16)
IMM GRANULOCYTES # BLD AUTO: 0.02 K/UL (ref 0–0.11)
IMM GRANULOCYTES NFR BLD AUTO: 0.5 % (ref 0–0.9)
LYMPHOCYTES # BLD AUTO: 0.32 K/UL (ref 1–4.8)
LYMPHOCYTES NFR BLD: 8.5 % (ref 22–41)
MCH RBC QN AUTO: 28.8 PG (ref 27–33)
MCH RBC QN AUTO: 28.9 PG (ref 27–33)
MCH RBC QN AUTO: 29.3 PG (ref 27–33)
MCHC RBC AUTO-ENTMCNC: 33.2 G/DL (ref 33.6–35)
MCHC RBC AUTO-ENTMCNC: 33.8 G/DL (ref 33.6–35)
MCHC RBC AUTO-ENTMCNC: 33.9 G/DL (ref 33.6–35)
MCV RBC AUTO: 85.5 FL (ref 81.4–97.8)
MCV RBC AUTO: 86.3 FL (ref 81.4–97.8)
MCV RBC AUTO: 86.7 FL (ref 81.4–97.8)
MONOCYTES # BLD AUTO: 0.09 K/UL (ref 0–0.85)
MONOCYTES NFR BLD AUTO: 2.4 % (ref 0–13.4)
NEUTROPHILS # BLD AUTO: 3.34 K/UL (ref 2–7.15)
NEUTROPHILS NFR BLD: 88.6 % (ref 44–72)
NRBC # BLD AUTO: 0 K/UL
NRBC BLD-RTO: 0 /100 WBC
PLATELET # BLD AUTO: 16 K/UL (ref 164–446)
PLATELET # BLD AUTO: 33 K/UL (ref 164–446)
PLATELET # BLD AUTO: 7 K/UL (ref 164–446)
PMV BLD AUTO: 12.9 FL (ref 9–12.9)
PMV BLD AUTO: 13.1 FL (ref 9–12.9)
POTASSIUM SERPL-SCNC: 3.8 MMOL/L (ref 3.6–5.5)
PROT SERPL-MCNC: 7.6 G/DL (ref 6–8.2)
RBC # BLD AUTO: 5.02 M/UL (ref 4.2–5.4)
RBC # BLD AUTO: 5.19 M/UL (ref 4.2–5.4)
RBC # BLD AUTO: 5.32 M/UL (ref 4.2–5.4)
SODIUM SERPL-SCNC: 134 MMOL/L (ref 135–145)
WBC # BLD AUTO: 3.8 K/UL (ref 4.8–10.8)
WBC # BLD AUTO: 6.5 K/UL (ref 4.8–10.8)
WBC # BLD AUTO: 9.4 K/UL (ref 4.8–10.8)

## 2018-03-09 PROCEDURE — 700111 HCHG RX REV CODE 636 W/ 250 OVERRIDE (IP): Performed by: STUDENT IN AN ORGANIZED HEALTH CARE EDUCATION/TRAINING PROGRAM

## 2018-03-09 PROCEDURE — 36415 COLL VENOUS BLD VENIPUNCTURE: CPT

## 2018-03-09 PROCEDURE — 80053 COMPREHEN METABOLIC PANEL: CPT

## 2018-03-09 PROCEDURE — 85027 COMPLETE CBC AUTOMATED: CPT

## 2018-03-09 PROCEDURE — 85025 COMPLETE CBC W/AUTO DIFF WBC: CPT

## 2018-03-09 PROCEDURE — 99239 HOSP IP/OBS DSCHRG MGMT >30: CPT | Mod: GC | Performed by: INTERNAL MEDICINE

## 2018-03-09 RX ORDER — DEXAMETHASONE 6 MG/1
40 TABLET ORAL DAILY
Qty: 26 TAB | Refills: 0 | Status: SHIPPED | OUTPATIENT
Start: 2018-03-09 | End: 2018-03-09

## 2018-03-09 RX ORDER — PREDNISONE 20 MG/1
40 TABLET ORAL DAILY
Qty: 28 TAB | Refills: 0 | Status: SHIPPED | OUTPATIENT
Start: 2018-03-09 | End: 2018-03-23

## 2018-03-09 RX ADMIN — METHYLPREDNISOLONE SODIUM SUCCINATE 125 MG: 125 INJECTION, POWDER, FOR SOLUTION INTRAMUSCULAR; INTRAVENOUS at 09:05

## 2018-03-09 RX ADMIN — IMMUNE GLOBULIN 10 G: 50 SOLUTION INTRAVENOUS at 01:12

## 2018-03-09 RX ADMIN — IMMUNE GLOBULIN 10 G: 50 SOLUTION INTRAVENOUS at 03:14

## 2018-03-09 RX ADMIN — IMMUNE GLOBULIN 10 G: 50 SOLUTION INTRAVENOUS at 04:15

## 2018-03-09 RX ADMIN — IMMUNE GLOBULIN 10 G: 50 SOLUTION INTRAVENOUS at 02:16

## 2018-03-09 ASSESSMENT — PAIN SCALES - GENERAL
PAINLEVEL_OUTOF10: 0

## 2018-03-09 NOTE — PROGRESS NOTES
I have personally evaluated this patient and agree with the findings as documented in the resident note except as documented in this attending note.  I am actively involved in the patient's care.    Physical Exam: Refused to me      Appreciate heme/Onc help,FU rec      Resident note to follow

## 2018-03-09 NOTE — ED PROVIDER NOTES
ED Provider Note    Scribed for Nigel Aparicio M.D. by Jeremy Jones. 3/8/2018, 4:34 PM.    Primary care provider: Brenda Davis P.A.-C.  Means of arrival: Walk-in  History obtained from: Patient  History limited by: None    CHIEF COMPLAINT  Chief Complaint   Patient presents with   • Abnormal Labs     pt was sent by MD for low platelet count.       CORAL Anderson is a 57 y.o. Female with a history of thrombocytopenia who presents to the Emergency Department for evaluation of abnormal labs results. Two weeks ago the patient experienced and episode of severe abdominal pain. The abdominal pain was located along her middle abdomen. She has been taking cedar oil, which has helped alleviate her abdominal pain.  She's been pain free for the last several days.  No hematemesis, melena, hematochezia.  Patient received blood work this morning which revealed a low platelet count. The patient reports associated bruising and petechiae on her extremities. She denies nausea, vomiting, fever, other unexplained bleeding, or chills. Patient was on and antibiotic for treatment of H. Pylori within the last year.  Was any other acute concerns or complaints.      REVIEW OF SYSTEMS  Review of Systems   Constitutional: Negative for fever.   Gastrointestinal: Positive for abdominal pain (resolved). Negative for nausea and vomiting.   Skin:        Positive for bruising and petechiae.   Endo/Heme/Allergies:        Positive for low platelet count.  Negative for other unexplained bleeding.   All other systems reviewed and are negative.  C    PAST MEDICAL HISTORY    thrombocytopenia     SURGICAL HISTORY  patient denies any surgical history    SOCIAL HISTORY  Social History   Substance Use Topics   • Smoking status: Never Smoker   • Smokeless tobacco: Never Used   • Alcohol use Yes      Comment: 2 glasses of wine weekly.       History   Drug Use No       FAMILY HISTORY  Family History   Problem Relation Age of Onset   •  "Alzheimer's Disease Paternal Grandmother    • Cancer Neg Hx    • Diabetes Neg Hx    • Heart Disease Neg Hx    • Stroke Neg Hx        CURRENT MEDICATIONS  No current facility-administered medications on file prior to encounter.      No current outpatient prescriptions on file prior to encounter.       ALLERGIES  No Known Allergies    PHYSICAL EXAM  VITAL SIGNS: /80   Pulse 81   Temp 36.8 °C (98.2 °F) (Temporal)   Resp 16   Ht 1.676 m (5' 6\")   Wt 70.7 kg (155 lb 13.8 oz)   SpO2 99%   BMI 25.16 kg/m²   Vitals reviewed.  Constitutional: Well developed, Well nourished, No acute distress, Non-toxic appearance.   HENT: Normocephalic, Atraumatic, Bilateral external ears normal, Oropharynx moist, No oral exudates, Nose normal.   Eyes: PERRL, EOMI, Conjunctiva normal, No discharge.   Neck: Normal range of motion, No tenderness, Supple, No stridor.   Cardiovascular: Normal heart rate, Normal rhythm, No murmurs, No rubs, No gallops.   Thorax & Lungs: Normal breath sounds, No respiratory distress, No wheezing,  Abdomen: Bowel sounds normal, Soft, No tenderness  Skin: Warm, Dry, No erythema, Petechiae and bruising of the extremities.   Back: No tenderness, No CVA tenderness.   Musculoskeletal: Good range of motion in all major joints.   Neurologic: Alert,, No focal deficits noted.   Psychiatric: Affect normal    LABS  Results for orders placed or performed during the hospital encounter of 03/08/18   CBC WITH DIFFERENTIAL   Result Value Ref Range    WBC 5.1 4.8 - 10.8 K/uL    RBC 5.39 4.20 - 5.40 M/uL    Hemoglobin 16.0 12.0 - 16.0 g/dL    Hematocrit 47.7 (H) 37.0 - 47.0 %    MCV 88.5 81.4 - 97.8 fL    MCH 29.7 27.0 - 33.0 pg    MCHC 33.5 (L) 33.6 - 35.0 g/dL    RDW 40.8 35.9 - 50.0 fL    Platelet Count 5 (LL) 164 - 446 K/uL    Neutrophils-Polys 62.00 44.00 - 72.00 %    Lymphocytes 26.70 22.00 - 41.00 %    Monocytes 7.60 0.00 - 13.40 %    Eosinophils 2.50 0.00 - 6.90 %    Basophils 0.80 0.00 - 1.80 %    Immature " Granulocytes 0.40 0.00 - 0.90 %    Nucleated RBC 0.00 /100 WBC    Neutrophils (Absolute) 3.19 2.00 - 7.15 K/uL    Lymphs (Absolute) 1.37 1.00 - 4.80 K/uL    Monos (Absolute) 0.39 0.00 - 0.85 K/uL    Eos (Absolute) 0.13 0.00 - 0.51 K/uL    Baso (Absolute) 0.04 0.00 - 0.12 K/uL    Immature Granulocytes (abs) 0.02 0.00 - 0.11 K/uL    NRBC (Absolute) 0.00 K/uL   COMP METABOLIC PANEL   Result Value Ref Range    Sodium 140 135 - 145 mmol/L    Potassium 3.6 3.6 - 5.5 mmol/L    Chloride 105 96 - 112 mmol/L    Co2 26 20 - 33 mmol/L    Anion Gap 9.0 0.0 - 11.9    Glucose 94 65 - 99 mg/dL    Bun 16 8 - 22 mg/dL    Creatinine 0.93 0.50 - 1.40 mg/dL    Calcium 10.1 8.5 - 10.5 mg/dL    AST(SGOT) 13 12 - 45 U/L    ALT(SGPT) 10 2 - 50 U/L    Alkaline Phosphatase 66 30 - 99 U/L    Total Bilirubin 0.7 0.1 - 1.5 mg/dL    Albumin 4.8 3.2 - 4.9 g/dL    Total Protein 7.5 6.0 - 8.2 g/dL    Globulin 2.7 1.9 - 3.5 g/dL    A-G Ratio 1.8 g/dL   PT/INR   Result Value Ref Range    PT 12.3 12.0 - 14.6 sec    INR 0.94 0.87 - 1.13   PTT   Result Value Ref Range    APTT 26.0 24.7 - 36.0 sec   PERIPHERAL SMEAR REVIEW   Result Value Ref Range    Peripheral Smear Review see below    IMMATURE PLT FRACTION   Result Value Ref Range    Imm. Plt Fraction 23.3 (H) 0.6 - 13.1 K/uL   DIFFERENTIAL COMMENT   Result Value Ref Range    Comments-Diff see below    ESTIMATED GFR   Result Value Ref Range    GFR If African American >60 >60 mL/min/1.73 m 2    GFR If Non African American >60 >60 mL/min/1.73 m 2      All labs reviewed by me.      No orders to display     The radiologist's interpretation of all radiological studies have been reviewed by me.    COURSE & MEDICAL DECISION MAKING  Pertinent Labs & Imaging studies reviewed. (See chart for details)      4:34 PM Patient seen and examined at bedside. The patient presents with , and the differential diagnosis includes but is not limited to, ITP, thrombocytopenia or others*. Ordered for peripheral smear review,  immature PLT fraction, differential comment, estimated GFR, PTT, CBC with differential, CMP, and PT/INR to evaluate. Patient will be treated with methylprednisolone sod succ 125 mg injection for her symptoms.      Patient will be admitted.  I spoke with Dr. Coronado the patient's oncologist earlier.  The admitting team will discuss this with him.  The patient will be admitted to [facility] medicine.  I was asked to give her IV cyanide all which I did.  She is admitted in guarded condition.    FINAL IMPRESSION  1. Thrombocytopenia (CMS-HCC)    2. Acute ITP (CMS-HCC)          Jeremy HANSEN (Scribe), am scribing for, and in the presence of, Nigel Aparicio M.D..    Electronically signed by: Jeremy Jones (Scribe), 3/8/2018    Nigel HANSEN M.D. personally performed the services described in this documentation, as scribed by Jeremy Jones in my presence, and it is both accurate and complete.    The note accurately reflects work and decisions made by me.  Nigel Aparicio  3/8/2018  6:33 PM

## 2018-03-09 NOTE — PROGRESS NOTES
Patient is frustrated, requesting for H. pylori to be checked through the blood.  Educated patient that due to her history of H. pylori, her test will be positive, but per patient she wanted to have the blood test done to know the level of H. pylori in her system if it was positive.  Educated patient that breath test for H.pylori is a more accurate measurement for H. Pylori.  Patient still refusing breath test.  Dr. Marcano was at bedside and discussed patient's frustrations.  Patient is still refusing breath test at this time. MD aware.  Awaiting for hematologist consult.

## 2018-03-09 NOTE — ED NOTES
Called report to Mary BLANCHARD. Pt is aware of POC. Pt belongings are on bed. Pt transported to floor by transportation tech. All pt care responsibilities relinquished.

## 2018-03-09 NOTE — PROGRESS NOTES
Pt is agreeable to have H. Pylori breath test, stated that she had breakfast.  Instructed patient to fast 1 hour prior to exam. Per patient she did not have any antibiotics or antacids within last 14 days.

## 2018-03-09 NOTE — H&P
Internal Medicine Admitting History and Physical    Note Author: Suzan Marcano M.D.       Name Pily Anderson     1960   Age/Sex 57 y.o. female   MRN [id number]   Code Status FULL     After 5PM or if no immediate response to page, please call for cross-coverage  Attending/Team: Dr Mojica/Bruce See Patient List for primary contact information  Call [phone] to page    1st Call - Day Intern (R1):   Krys 2nd Call - Day Sr. Resident (R2/R3):   Kale       Chief Complaint:  ITP    HPI:  58 y/o F with a pmh of ITP was sent by her MD for thrombocytopenia. States this is the 3rd time this has happened to her and she just wants her steroids and IVIG and to leave tomorrow. She has petechia on her lower extremities, no mucosal bleeding. Hemodynamically stable. States previous episodes were associated with H pylori, and that was treated with H pylori within the past year and is requesting antibiotics for it. Denies any other complaints, refuses to give any other information.    Review of Systems   Constitutional: Negative.  Negative for chills and fever.   HENT: Negative.  Negative for hearing loss and sore throat.    Eyes: Negative.  Negative for blurred vision.   Respiratory: Negative.  Negative for cough and shortness of breath.    Cardiovascular: Negative.  Negative for chest pain, palpitations and leg swelling.   Gastrointestinal: Negative.  Negative for abdominal pain, constipation, diarrhea, heartburn, nausea and vomiting.   Genitourinary: Negative.  Negative for dysuria, frequency, hematuria and urgency.   Musculoskeletal: Negative.  Negative for joint pain and myalgias.   Skin: Negative for rash.        petechia    Neurological: Negative.  Negative for dizziness, tingling, weakness and headaches.             Past Medical History:   No past medical history on file.    Past Surgical History:  No past surgical history on file.    Current Outpatient Medications:  Home Medications     Reviewed by Bianca  "Edwina York (Pharmacy Tech) on 03/08/18 at 1745  Med List Status: Complete   Medication Last Dose Status   ascorbic acid (ASCORBIC ACID) 500 MG Tab 3/8/2018 Active                Medication Allergy/Sensitivities:  No Known Allergies      Family History:  Family History   Problem Relation Age of Onset   • Alzheimer's Disease Paternal Grandmother    • Cancer Neg Hx    • Diabetes Neg Hx    • Heart Disease Neg Hx    • Stroke Neg Hx        Social History:  Social History     Social History   • Marital status: Single     Spouse name: N/A   • Number of children: N/A   • Years of education: N/A     Occupational History   • Not on file.     Social History Main Topics   • Smoking status: Never Smoker   • Smokeless tobacco: Never Used   • Alcohol use Yes      Comment: 2 glasses of wine weekly.    • Drug use: No   • Sexual activity: Not Currently     Other Topics Concern   • Not on file     Social History Narrative   • No narrative on file     Living situation: at home with [facility]   PCP : Brenda Davis P.A.-C.      Physical Exam     Vitals:    03/08/18 1253 03/08/18 1306 03/08/18 1734 03/08/18 1800   BP: 130/80      Pulse: 81  87 81   Resp: 16  17 (!) 9   Temp: 36.8 °C (98.2 °F)      TempSrc: Temporal      SpO2: 99%  99% 99%   Weight:  70.7 kg (155 lb 13.8 oz)     Height: 1.676 m (5' 6\")        Body mass index is 25.16 kg/m².  /80   Pulse 81   Temp 36.8 °C (98.2 °F) (Temporal)   Resp (!) 9   Ht 1.676 m (5' 6\")   Wt 70.7 kg (155 lb 13.8 oz)   SpO2 99%   BMI 25.16 kg/m²   O2 therapy: Pulse Oximetry: 99 %, O2 Delivery: None (Room Air)    Physical Exam: Refused       Data Review       Old Records Request:   Completed  Current Records review and summary: Completed    Lab Data Review:  Recent Results (from the past 24 hour(s))   CBC WITH DIFFERENTIAL    Collection Time: 03/08/18  4:30 PM   Result Value Ref Range    WBC 5.1 4.8 - 10.8 K/uL    RBC 5.39 4.20 - 5.40 M/uL    Hemoglobin 16.0 12.0 - 16.0 g/dL    " Hematocrit 47.7 (H) 37.0 - 47.0 %    MCV 88.5 81.4 - 97.8 fL    MCH 29.7 27.0 - 33.0 pg    MCHC 33.5 (L) 33.6 - 35.0 g/dL    RDW 40.8 35.9 - 50.0 fL    Platelet Count 5 (LL) 164 - 446 K/uL    Neutrophils-Polys 62.00 44.00 - 72.00 %    Lymphocytes 26.70 22.00 - 41.00 %    Monocytes 7.60 0.00 - 13.40 %    Eosinophils 2.50 0.00 - 6.90 %    Basophils 0.80 0.00 - 1.80 %    Immature Granulocytes 0.40 0.00 - 0.90 %    Nucleated RBC 0.00 /100 WBC    Neutrophils (Absolute) 3.19 2.00 - 7.15 K/uL    Lymphs (Absolute) 1.37 1.00 - 4.80 K/uL    Monos (Absolute) 0.39 0.00 - 0.85 K/uL    Eos (Absolute) 0.13 0.00 - 0.51 K/uL    Baso (Absolute) 0.04 0.00 - 0.12 K/uL    Immature Granulocytes (abs) 0.02 0.00 - 0.11 K/uL    NRBC (Absolute) 0.00 K/uL   COMP METABOLIC PANEL    Collection Time: 03/08/18  4:30 PM   Result Value Ref Range    Sodium 140 135 - 145 mmol/L    Potassium 3.6 3.6 - 5.5 mmol/L    Chloride 105 96 - 112 mmol/L    Co2 26 20 - 33 mmol/L    Anion Gap 9.0 0.0 - 11.9    Glucose 94 65 - 99 mg/dL    Bun 16 8 - 22 mg/dL    Creatinine 0.93 0.50 - 1.40 mg/dL    Calcium 10.1 8.5 - 10.5 mg/dL    AST(SGOT) 13 12 - 45 U/L    ALT(SGPT) 10 2 - 50 U/L    Alkaline Phosphatase 66 30 - 99 U/L    Total Bilirubin 0.7 0.1 - 1.5 mg/dL    Albumin 4.8 3.2 - 4.9 g/dL    Total Protein 7.5 6.0 - 8.2 g/dL    Globulin 2.7 1.9 - 3.5 g/dL    A-G Ratio 1.8 g/dL   PT/INR    Collection Time: 03/08/18  4:30 PM   Result Value Ref Range    PT 12.3 12.0 - 14.6 sec    INR 0.94 0.87 - 1.13   PTT    Collection Time: 03/08/18  4:30 PM   Result Value Ref Range    APTT 26.0 24.7 - 36.0 sec   PERIPHERAL SMEAR REVIEW    Collection Time: 03/08/18  4:30 PM   Result Value Ref Range    Peripheral Smear Review see below    IMMATURE PLT FRACTION    Collection Time: 03/08/18  4:30 PM   Result Value Ref Range    Imm. Plt Fraction 23.3 (H) 0.6 - 13.1 K/uL   DIFFERENTIAL COMMENT    Collection Time: 03/08/18  4:30 PM   Result Value Ref Range    Comments-Diff see below     ESTIMATED GFR    Collection Time: 03/08/18  4:30 PM   Result Value Ref Range    GFR If African American >60 >60 mL/min/1.73 m 2    GFR If Non African American >60 >60 mL/min/1.73 m 2   MAGNESIUM    Collection Time: 03/08/18  4:30 PM   Result Value Ref Range    Magnesium 2.1 1.5 - 2.5 mg/dL       Imaging/Procedures Review:    ndependant Imaging Review: Completed  No orders to display             Assessment/Plan     * Acute ITP (CMS-[facility])- (present on admission)   Assessment & Plan    - hx of ITP in the past, treated with steroids and IVIG. Follows with hematology, per note she refuses steroids and prefers to take herbal supplements  - previously treated for H pylori states she was told thrombocytopenia is associated with it. A quick review of a few studies shows there may be an association with H pylori and ITP  - petechia seen on exam, denies mucosal bleeding or any other symptoms. Refused further examination   - continue steroids, start IV IG  - urea breath test ordered            Anticipated Hospital stay: Inpatient         Quality Measures  Quality-Core Measures   Reviewed items::  Labs reviewed and Medications reviewed  DVT prophylaxis - mechanical:  SCDs

## 2018-03-09 NOTE — DISCHARGE PLANNING
note:  [facility] said possible dc today after Hemotologist clears pt.    Addendum:   [facility] confirmed that they will discharge pt today.

## 2018-03-09 NOTE — PROGRESS NOTES
"Report received from Nurse Kearns. Patient transported to unit via Wheelchair. Patient appears irritable, agitated, and anxious. Patient states \"I don't want to share a room with someone.\" One-on-One discussion and reassurance provided. Patient states \" I can sit out here but I am not going into the room.\" Patient states \"This is the worst hospital experience.\" Charge nurse notified of situation. Patient redirected to family lounge room for comfort. Patient states \"I prefer to stay alone in this room.' Patient educated that patient room is still open for patient, if at anytime patient changes there mind. Patient refuses to do 2 RN skin check. Labs and orders reviewed. Assessment completed. Patient denies any pain at this time. Patient provided with scheduled medication, refer to MAR. Plan of care discussed with patient; questions have been answered at this time. Patient needs have been met at this time. RN provided patient with direct phone number to call, to get a hold of RN.Hourly rounding in place. /70   Pulse 95   Temp 36.4 °C (97.5 °F)   Resp 16   Ht 1.676 m (5' 6\")   Wt 70.7 kg (155 lb 13.8 oz)   SpO2 97%   BMI 25.16 kg/m² .   "

## 2018-03-09 NOTE — PROGRESS NOTES
Received report from Lorelei Torres.  Pt. Is alert, awake, and oriented to self, time, date, and place.  Pt. Is in the family room, states she did not want to stay in her bedroom because she does not like sharing rooms, informed patient that unit does not have private rooms at this time.  Informed MD of same.  Pt. Is sitting in chair, non labored breathing on room air, no signs of acute distress noted at this time. Bed in lowest position.  Call light and belongings within reach. Will continue to monitor.

## 2018-03-09 NOTE — CARE PLAN
Problem: Safety  Goal: Will remain free from injury    Intervention: Provide assistance with mobility  Patient is ambulatory, steady on her feet, independent.  Educated patient that due to low platelets, it increases her risk for bleeding.  Educated patient on fall safety precautions-verbalized understanding.

## 2018-03-09 NOTE — DISCHARGE SUMMARY
Internal Medicine Discharge Summary  Note Author: Suzan Marcano M.D.       Admit Date:  3/8/2018       Discharge Date:   03/09/18    Service:   R Internal Medicine [place] Team  Attending Physician(s):   Dr Mojica       Senior Resident(s):   Dr Henley  Eric Resident(s):   Dr Marcano      Primary Diagnosis:   Acute ITP    Secondary Diagnoses:                Principal Problem:    Acute ITP (CMS-HCC) POA: Yes  Resolved Problems:    * No resolved hospital problems. *    ADDENDUM: Received a call from Dr. Herrera requesting to change pt's Decadron Rx to Prednisone 40 mg x 14 days, as well as draw an H.Pylori blood antibody.  Pt is to get her labs (CBC) drawn in 1 week and follow up with Dr. Coronado as originally discussed. Per lab H. Pylori blood antibody is no longer performed at [facility]. Prescription for out-pt lab order provided.  - Addendum added by Layne Henley M.D. PGY2    Hospital Summary (Brief Narrative):       56 y/o F with a pmh of ITP was sent by her MD for thrombocytopenia. States this is the 3rd time this has happened to her. She has petechia on her lower extremities, no mucosal bleeding or any other complaints. In ED only thing significant on labs was a platelet count of 5. She was admitted and treated with steroids and IV IG, plt count improved to 33 the following day. Hematology were consulted and recommended discharge with dexamethasone 40 for 4 days. Remained asymptomatic during hospitalization.  She needs to follow up with her hematologist on discharge with CBC in 1 week.     Of note she was very noncompliant with treatment. She refused physical exams, didn't want to sleep in her room and was demanding CBC every few hours. She was also repeatedly asking for H pylori serum antibody test, claims she was treated for positive H pylori in the past and wanted it checked. Some studies have shown a correlation between H pylori infection and ITP. She was offered stool antigen test and urea breath test  "but refused and demanded serum antibody test. It was explained to the patient that results would be positive if she had the infection in the past and confirmatory breath or stool tests would be needed to confirm diagnosis. She was not convinced and asked for antibiotics for H pylori instead, she was told confirmation of re-infection is needed prior to treatment since treatment would be different if reinfection. She became angry and said \"you [facility] doctors don't want to give me the treatment I want\". The above was explained to her again but she refused recommended steps.       Patient /Hospital Summary (Details -- Problem Oriented) :          * Acute ITP (CMS-[facility])   Assessment & Plan    - hx of ITP in the past, treated with steroids and IVIG. Follows with hematology, per note she refuses steroids and prefers to take herbal supplements  - previously treated for H pylori states she was told thrombocytopenia is associated with it. A quick review of a few studies shows there may be an association with H pylori and ITP  - petechia seen on exam, denies mucosal bleeding or any other symptoms. Refused further examination   - continue steroids, start IV IG  - urea breath test ordered but she refused  - plt improved to >30 on discharge, hematology recommended dexamethasone 40mg for 4 days            Consultants:     Hematology     Procedures:        None    Imaging/ Testing:      No orders to display         Discharge Medications:         Medication Reconciliation: Completed       Medication List      START taking these medications      Instructions   calcium-vitamin D 500-200 MG-UNIT Tabs  Commonly known as:  OSCAL 500 +D   Take 2 Tabs by mouth 2 times a day, with meals.  Dose:  2 Tab     predniSONE 20 MG Tabs  Commonly known as:  DELTASONE   Take 2 Tabs by mouth every day for 14 days.  Dose:  40 mg        CONTINUE taking these medications      Instructions   ascorbic acid 500 MG Tabs  Commonly known as:  ascorbic acid   " Take 500-1,000 mg by mouth every day. Patient takes 1-2 tablets a day depending on how she feels  Dose:  500-1000 mg            Can use .DISCHARGEMEDSLIST if going to another facility         Disposition:   home    Diet:   regualr    Activity:   As tolerated     Instructions:         The patient was instructed to return to the ER in the event of worsening symptoms. I have counseled the patient on the importance of compliance and the patient has agreed to proceed with all medical recommendations and follow up plan indicated above.   The patient understands that all medications come with benefits and risks. Risks may include permanent injury or death and these risks can be minimized with close reassessment and monitoring.        Primary Care Provider:    Brenda Davis P.A.-C.  Discharge summary faxed to primary care provider:  Completed  Copy of discharge summary given to the patient: Deferred      Follow up appointment details :      Follow up with hematology in 1 week with CBC and with PCP.     Pending Studies:        none    Time spent on discharge day patient visit, preparing discharge paperwork and arranging for patient follow up.    Summary of follow up issues:   Hematology and PCP    Discharge Time (Minutes) :    60min  Hospital Course Type: Inpatient Stay < 2 midnights, patient recovered more rapidly than anticipated      Condition on Discharge    ______________________________________________________________________    Interval history/exam for day of discharge:     No acute events overnight. She denies any complaints and just wants H pylori serum test, see summary above. States she just wants to go home. Discussed treatment on discharge and the need to follow up with PCP, she understands.     Vitals:    03/08/18 1830 03/08/18 2100 03/09/18 0520 03/09/18 0853   BP:  122/70 121/83 125/75   Pulse: 82 95 (!) 105 (!) 102   Resp: 12 16 16 16   Temp:  36.4 °C (97.5 °F) 36.9 °C (98.4 °F) 36.9 °C (98.5 °F)    TempSrc:       SpO2: 100% 97% 96% 97%   Weight:       Height:         Weight/BMI: Body mass index is 25.16 kg/m².  Pulse Oximetry: 97 %, O2 (LPM): 0, O2 Delivery: None (Room Air)    Refused physical exam.    Most Recent Labs:    Lab Results   Component Value Date/Time    WBC 9.4 03/09/2018 02:53 PM    RBC 5.32 03/09/2018 02:53 PM    HEMOGLOBIN 15.3 03/09/2018 02:53 PM    HEMATOCRIT 46.1 03/09/2018 02:53 PM    MCV 86.7 03/09/2018 02:53 PM    MCH 28.8 03/09/2018 02:53 PM    MCHC 33.2 (L) 03/09/2018 02:53 PM    MPV 13.1 (H) 03/09/2018 02:53 PM    NEUTSPOLYS 88.60 (H) 03/09/2018 02:22 AM    LYMPHOCYTES 8.50 (L) 03/09/2018 02:22 AM    MONOCYTES 2.40 03/09/2018 02:22 AM    EOSINOPHILS 0.00 03/09/2018 02:22 AM    BASOPHILS 0.00 03/09/2018 02:22 AM      Lab Results   Component Value Date/Time    SODIUM 134 (L) 03/09/2018 02:22 AM    POTASSIUM 3.8 03/09/2018 02:22 AM    CHLORIDE 103 03/09/2018 02:22 AM    CO2 23 03/09/2018 02:22 AM    GLUCOSE 230 (H) 03/09/2018 02:22 AM    BUN 17 03/09/2018 02:22 AM    CREATININE 0.91 03/09/2018 02:22 AM    BUNCREATRAT 24 (H) 06/01/2017 09:16 AM      Lab Results   Component Value Date/Time    ALTSGPT 9 03/09/2018 02:22 AM    ASTSGOT 12 03/09/2018 02:22 AM    ALKPHOSPHAT 61 03/09/2018 02:22 AM    TBILIRUBIN 0.7 03/09/2018 02:22 AM    ALBUMIN 4.1 03/09/2018 02:22 AM    GLOBULIN 3.5 03/09/2018 02:22 AM    INR 0.94 03/08/2018 04:30 PM     Lab Results   Component Value Date/Time    PROTHROMBTM 12.3 03/08/2018 04:30 PM    INR 0.94 03/08/2018 04:30 PM

## 2018-03-09 NOTE — CONSULTS
Consult Note: Oncology    Date of consultation: 3/9/2018 3:58 PM    Referring provider: Dr Pao Henley    Reason for consultation:  Refractory ITP     History of presenting illness:      Pily Anderson   is a 57 y.o. year old patient of Dr. Coronado who  is admitted with ITP.  . She received  Solu-Medrol IVIG and pulse dexamethasone in June 2017 followed by outpatient prednisone had sustained response for many months after which she was a no-show.. Interestingly, she had H. pylori antibody positivity with a negative breath test. She received Solu-Medrol and overnight IVIG. Platelet count this morning, came up to 16. She is grossly asymptomatic. She is refusing splenectomy or rituximab. Dr. Coronado had multiple discussions about this with her. She has been eating papaya leaf and believes that the keep her platelets up. She was a no show for many months and again presented with low platelet of 7.        PAST MEDICAL HISTORY:  Is pertinent for no surgeries.  She has no past medical   history except for the questionable history of multiple sclerosis back in    2001, which really did not pan out.      She takes no prescription drugs, but does   frequently take ibuprofen 600 mg a day for back pain when she is working.     Social history she does not smoke.  She occasionally drinks alcohol         Medications:    Current Facility-Administered Medications   Medication Dose Route Frequency Provider Last Rate Last Dose   • senna-docusate (PERICOLACE or SENOKOT S) 8.6-50 MG per tablet 2 Tab  2 Tab Oral BID Suzan Marcano M.D.        And   • polyethylene glycol/lytes (MIRALAX) PACKET 1 Packet  1 Packet Oral QDAY PRN Suzan Marcano M.D.        And   • magnesium hydroxide (MILK OF MAGNESIA) suspension 30 mL  30 mL Oral QDAY PRN Suzan Marcano M.D.        And   • bisacodyl (DULCOLAX) suppository 10 mg  10 mg Rectal QDAY PRN Suzan Marcano M.D.       • acetaminophen (TYLENOL) tablet 650 mg  650 mg Oral Q6HRS PRN Suzan SCHERER  "LIZ Marcano       • methylPREDNISolone sod succ (SOLU-MEDROL) 125 MG injection 125 mg  125 mg Intravenous Q12HRS Suzan SCHERER LIZ Marcano   125 mg at 03/09/18 0905       Social History:     Social History     Social History   • Marital status: Single     Spouse name: N/A   • Number of children: N/A   • Years of education: N/A     Occupational History   • Not on file.     Social History Main Topics   • Smoking status: Never Smoker   • Smokeless tobacco: Never Used   • Alcohol use Yes      Comment: 2 glasses of wine weekly.    • Drug use: No   • Sexual activity: Not Currently     Other Topics Concern   • Not on file     Social History Narrative   • No narrative on file       Family History:     Family History   Problem Relation Age of Onset   • Alzheimer's Disease Paternal Grandmother    • Cancer Neg Hx    • Diabetes Neg Hx    • Heart Disease Neg Hx    • Stroke Neg Hx        Review of Systems:  All other review of systems are negative except what was mentioned above in the HPI.    Constitutional: No fever, chills, weight loss ,malaise/fatigue.    HEENT: No new auditory or visual complaints. No sore throat and neck pain.     Respiratory:No new cough, sputum production, shortness of breath and wheezing.    Cardiovascular: No new chest pain, palpitations, orthopnea and leg swelling.    Gastrointestinal: No heartburn, nausea, vomiting ,abdominal pain, hematochezia or melena     Genitourinary: Negative for dysuria, hematuria    Musculoskeletal: No new arthralgias or myalgias   Skin: Negative for rash and itching.    Neurological: Negative for focal weakness or headaches.    Endo/Heme/Allergies: No abnormal bleed/bruise.    Psychiatric/Behavioral: No new depression/anxiety.    Physical Exam:  Vitals:   /75   Pulse (!) 102   Temp 36.9 °C (98.5 °F)   Resp 16   Ht 1.676 m (5' 6\")   Wt 70.7 kg (155 lb 13.8 oz)   SpO2 97%   BMI 25.16 kg/m²     General: Not in acute distress, alert and oriented x 3  HEENT: No pallor, " icterus. Oropharynx clear.   Neck: Supple, no palpable masses.  Lymph nodes: No palpable cervical, supraclavicular, axillary or inguinal lymphadenopathy.    CVS: regular rate and rhythm, no rubs or gallops  RESP: Clear to auscultate bilaterally, no wheezing or crackles.   ABD: Soft, non tender, non distended, positive bowel sounds, no palpable organomegaly  EXT: No edema or cyanosis  CNS: Alert and oriented x3, No focal deficits.  Skin- No rash      Labs:   Recent Labs      03/08/18   1630  03/09/18   0222  03/09/18   0748  03/09/18   1453   RBC  5.39  5.02  5.19  5.32   HEMOGLOBIN  16.0  14.7  15.0  15.3   HEMATOCRIT  47.7*  43.3  44.4  46.1   PLATELETCT  5*  7*  16*  33*   PROTHROMBTM  12.3   --    --    --    APTT  26.0   --    --    --    INR  0.94   --    --    --      Lab Results   Component Value Date/Time    SODIUM 134 (L) 03/09/2018 02:22 AM    POTASSIUM 3.8 03/09/2018 02:22 AM    CHLORIDE 103 03/09/2018 02:22 AM    CO2 23 03/09/2018 02:22 AM    GLUCOSE 230 (H) 03/09/2018 02:22 AM    BUN 17 03/09/2018 02:22 AM    CREATININE 0.91 03/09/2018 02:22 AM    BUNCREATRAT 24 (H) 06/01/2017 09:16 AM        Assessment and Plan:  Refractory ITP- unfortunately, this is a recurrent problem for her. Dr. Coronado has discussed  Rituxan or splenectomy with her before and she is not amenable for this. I again discussed this and she said she does not want to do anything else. Her platelets have improved and subsequent platelet check from the afternoon showed improved count of 36. Dr. Coronado called me and suggested a H. pylori testing and extended prednisone. She can be discharged on prednisone 40 mg with plans for subsequent taper. She will get cbc checked next week for f/u.     She agreed and verbalized her agreement and understanding with the current plan.  I answered all questions and concerns she has at this time.              Thank you for allowing me to participate in her care.    Please note that this dictation was  created using voice recognition software. I have made every reasonable attempt to correct obvious errors, but I expect that there are errors of grammar and possibly content that I did not discover before finalizing the note.      SIGNATURES:  Emmtet Herrera    CC:  [email or web address].  No ref. provider found

## 2018-03-09 NOTE — PROGRESS NOTES
Nandini from Lab called with critical result of Platelet Count of 7 at 0258. Critical lab result read back to Nandini.   This critical lab result is within parameters established by [facility]  for this patient. Will continue to Monitor Platelet Count.

## 2018-03-09 NOTE — CARE PLAN
Problem: Discharge Barriers/Planning  Goal: Patient's continuum of care needs will be met    Intervention: Assess potential discharge barriers on admission and throughout hospital stay  Patient's platelet level 16 after IVIG treatment.  Pending hematology clearance. Will inform of patient of same.

## 2018-03-09 NOTE — ED NOTES
"Pt roomed, provided a gown to change into and patient declined \"No, unnecessary\".  Pt aware of need for eval by ERP and states \"I need nothing, just to go upstairs and get my immuneglobulin and let's just do this as quickly as possible because I am busy\".  Dr Aparicio aware of patient.  "

## 2018-03-09 NOTE — PROGRESS NOTES
Shalini  from Lab called with critical result of platelets 16 at 0830. Critical lab result read back to Kelsie Bauer.   Dr. Marcano notified of critical lab result at 0900.  Critical lab result read back by Dr. Marcano.

## 2018-03-09 NOTE — CARE PLAN
Problem: Safety  Goal: Will remain free from injury    Intervention: Provide assistance with mobility  Patient is up self, steady gait. Patient educated to call RN when needing assistance. Environment is clutter free.       Problem: Knowledge Deficit  Goal: Knowledge of disease process/condition, treatment plan, diagnostic tests, and medications will improve    Intervention: Assess knowledge level of disease process/condition, treatment plan, diagnostic tests, and medications  Plan of care discussed with patient, questions have been answered at this time. Patient encouraged to notify RN in regards to additional questions. Patient encouraged to voice feelings.

## 2018-03-09 NOTE — ASSESSMENT & PLAN NOTE
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 Follows with hematology, per note she refuses steroids and prefers to take herbal supplements  - previously treated for H pylori states she was told thrombocytopenia is associated with it. A quick review of a few studies shows there may be an association with H pylori and ITP  - petechia seen on exam, denies mucosal bleeding or any other symptoms. Refused further examination   - continue steroids, start IV IG  - urea breath test ordered but she refused  - plt improved to >30 on discharge, hematology recommended dexamethasone 40mg for 4 days

## 2018-03-09 NOTE — ED NOTES
Lab called with critical result of Platelets 5 at 1716. Critical lab result read back to Lab.   Dr. Aparicio notified of critical lab result at 1717.  Critical lab result read back by Dr. Aparicio.

## 2018-03-09 NOTE — ED NOTES
Med rec completed per pt at bedside  Allergies reviewed - NKDA  No ABX in last 30 days   No prescription medications

## 2018-03-09 NOTE — SENIOR ADMIT NOTE
"[facility] INTERNAL MEDICINE SENIOR ADMIT NOTE:  Layne Henley, PGY 2    Patient ID:   Name:             Pily Anderson   YOB: 1960  Age:                 57 y.o.  female   MRN:               [id number]                                                          Chief Complaint:       ITP    History of Present Illness:    Mr. Anderson is a 57 y.o. female with a PMHx of ITP comes in today secondary to petechia on her LE. States I have dealt with this for a long time and I know when my plt's are below 10. Has been tx for the same at [facility] and follows w/ Dr. Coronado Hem/Onc. Has had some epigastric pain/GERD type symptoms. Has been treated in the past for H. Pylori and feels that her ITP is associated with this infection.    ROS: Negative for f/c, n/v, d/c, bruising, nose bleeds, sick contacts, new foods, new medications.  LABS: wbc 5.1, H&H 16/47.7, plt 5, cmp wnl, coags wnl.    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  Blood pressure 130/80, pulse 82, temperature 36.8 °C (98.2 °F), temperature source Temporal, resp. rate 12, height 1.676 m (5' 6\"), weight 70.7 kg (155 lb 13.8 oz), SpO2 100 %.  Vitals:    03/08/18 1306 03/08/18 1734 03/08/18 1800 03/08/18 1830   BP:       Pulse:  87 81 82   Resp:  17 (!) 9 12   Temp:       TempSrc:       SpO2:  99% 99% 100%   Weight: 70.7 kg (155 lb 13.8 oz)      Height:         Oxygen Therapy:  Pulse Oximetry: 100 %, O2 Delivery: None (Room Air)    Declined physical exam:  Well developed female in no acute distress, normocephalic, normal skin color, EOMI, HR regular on the monitor, equal chest rise " and fall, no cough during the interview, abdomen does not appear distended, able to move all extremities, facial structures symmetric without obvious weakness/deformity. Petechia present on LE      ASSESSMENT:  ITP - follows w/ Dr. Coronado from hem/onc.  Has been tx for the same here at [facility] twice before  ?H.Pylori    PLAN:  Admit to medical floor  Regular diet  Methylprednisolone 125mg q12hr and IVIG 5% 1g/kg  Close monitoring for bleeding  Fall precaution  Contact Dr. Coronado   H.Pylori urea breath test  No DVT prophylaxis due to low plt count. SCD's only, ambulate frequently  Am labs for levels      Code status: FULL  DVT prophylaxis SCD's only and ambulate frequently    Please refer to Intern's (Dr. Marcano) H&P for complete admission details.

## 2018-03-10 NOTE — PROGRESS NOTES
- Received a call from Dr. Herrera requesting to change pt's Decadron 6mg Rx to Prednisone 40 mg qday x 14 days, as well as draw an H.Pylori blood antibody.  Pt is to get her labs (CBC) drawn in 1 week and follow up with Dr. Coronado as originally discussed.   - Per lab H. Pylori blood antibody is no longer performed at [facility]. Prescription for out-pt lab order provided.

## 2018-03-10 NOTE — PROGRESS NOTES
Educated patient on discharge information including follow up appointment with lab and Dr. Coronado, prescribed medications and s&s of when to go to the emergency room. Pt. Verbalized understanding. Left forearm IV removed with tip intact.  Provided food to patient prior to discharge-states she will eat her dinner and ambulate down the unit.

## 2018-03-10 NOTE — PROGRESS NOTES
Pt requesting the blood tube from her admission on 03/08/18 at 14:53 to be sent to lab[facility] ([facility]). I discussed this request with the lab confirming hospital policy. Per lab, this is acceptable and a miscellaneous order needs to be placed with instructions to transfer the sample (CBC) and time/date of the sample drawn to be transferred indicated on the order.

## 2018-03-10 NOTE — DISCHARGE INSTRUCTIONS
Discharge Instructions    Discharged to home by car with relative. Discharged via walking, hospital escort: Yes.  Special equipment needed: Not Applicable    Be sure to schedule a follow-up appointment with your primary care doctor or any specialists as instructed.     Discharge Plan:        I understand that a diet low in cholesterol, fat, and sodium is recommended for good health. Unless I have been given specific instructions below for another diet, I accept this instruction as my diet prescription.   Other diet: Regular      Special Instructions: None    · Is patient discharged on Warfarin / Coumadin?   No     Idiopathic Thrombocytopenic Purpura  Introduction  Idiopathic thrombocytopenic purpura (ITP) is a disease in which your body’s defense system (immune system) attacks your platelets. Platelets are blood cells that help form clots and seal leaks in damaged blood vessels. With ITP, you to have too few platelets. As a result, you bleed more easily. It is also harder for your body to stop any bleeding.  In adults, ITP is usually a long-term disease.  What are the causes?  The cause is unknown. ITP may develop after a viral infection, during pregnancy, or from an immune disorder.  What increases the risk?  The risk of ITP may be greater among:  · Women.  · Adults 20-50 years old.  What are the signs or symptoms?  Common signs and symptoms include:  · Easy bruising.  · A cut that bleeds for a long time.  · Tiny purple blood dots (petechiae) under the skin, especially on the shins.  · Blood in urine or bowel movements.  · Nosebleeds.  · Bleeding gums.  · Heavy menstrual periods.  Mild forms of ITP may not cause any symptoms.  How is this diagnosed?  Your health care provider may suspect ITP based on your signs and symptoms. To make a diagnosis, your health care provider may do a physical exam and order blood tests to:  · Find how many platelets you have.  · See how well your blood clots.  Your health care provider  may then do blood tests or a bone marrow test to rule out other conditions that could be causing your symptoms.  How is this treated?  Treatment depends on the severity of your condition. Options include:  · Monitoring of your condition and platelet count.  · Blood transfusions of antibodies or platelets.  · Medicines such as:  ¨ Strong anti-inflammatory medicines (steroids).  ¨ Medicines to boost platelet production.  ¨ Medicines to suppress your immune system.  · Removal of your spleen. This may be done if other treatments do not work.  Follow these instructions at home:  · Take medicines only as directed by your health care provider.  · Do not take over-the-counter medicines that lower your platelet count, affect platelet function, or affect your blood’s ability to clot. These include aspirin and ibuprofen.  · Do not participate in contact sports or other high-risk activities. Ask your health care provider which activities are safe for you.  · Keep all follow-up visits as directed by your health care provider. This is important.  Contact a health care provider if:  · You have new symptoms.  · Your symptoms get worse.  Get help right away if:  · You have a sudden severe headache or confusion.  · You have significant bleeding.  · You have nausea and vomiting.  This information is not intended to replace advice given to you by your health care provider. Make sure you discuss any questions you have with your health care provider.  Document Released: 07/15/2015 Document Revised: 05/25/2017 Document Reviewed: 05/07/2015  © 2017 Elsevier      Helicobacter Pylori Infection  Introduction  Helicobacter pylori infection is an infection in the stomach that is caused by the Helicobacter pylori (H. pylori) bacteria. This type of bacteria often lives in the lining of the stomach. The infection can cause ulcers and irritation (gastritis) in some people. It is the most common cause of ulcers in the stomach (gastric ulcer) and in  the upper part of the intestine (duodenal ulcer). Having this infection may also increase the risk of stomach cancer and a type of white blood cell cancer (lymphoma) that affects the stomach.  What are the causes?  H. pylori is a type of bacteria that is often found in the stomachs of healthy people. The bacteria may be passed from person to person through contact with stool or saliva. It is not known why some people develop ulcers, gastritis, or cancer from the infection.  What increases the risk?  This condition is more likely to develop in people who:  · Have family members with the infection.  · Live with many other people, such as in a dormitory.  · Are of , , or  descent.  What are the signs or symptoms?  Most people with this infection do not have symptoms. If you do have symptoms, they may include:  · Heartburn.  · Stomach pain.  · Nausea.  · Vomiting.  · Blood-tinged vomit.  · Loss of appetite.  · Bad breath.  How is this diagnosed?  This condition may be diagnosed based on your symptoms, a physical exam, and various tests. Tests may include:  · Blood tests or stool tests to check for the proteins (antibodies) that your body may produce in response to the bacteria. These tests are the best way to confirm the diagnosis.  · A breath test to check for the type of gas that the H. pylori bacteria release after breaking down a substance called urea. For the test, you are asked to drink urea. This test is often done after treatment in order to find out if the treatment worked.  · A procedure in which a thin, flexible tube with a tiny camera at the end is placed into your stomach and upper intestine (upper endoscopy). Your health care provider may also take tissue samples (biopsy) to test for H. pylori and cancer.  How is this treated?  Treatment for this condition usually involves taking a combination of medicines (triple therapy) for a couple of weeks. Triple therapy includes one medicine to  reduce the acid in your stomach and two types of antibiotic medicines. Many drug combinations have been approved for treatment. Treatment usually kills the H. pylori and reduces your risk of cancer. You may need to be tested for H. pylori again after treatment. In some cases, the treatment may need to be repeated.  Follow these instructions at home:  · Take over-the-counter and prescription medicines only as told by your health care provider.  · Take your antibiotics as told by your health care provider. Do not stop taking the antibiotics even if you start to feel better.  · You can do all your usual activities and eat what you usually do.  · Take steps to prevent future infections:  ¨ Wash your hands often.  ¨ Make sure the food you eat has been properly prepared.  ¨ Drink water only from clean sources.  · Keep all follow-up visits as told by your health care provider. This is important.  Contact a health care provider if:  · Your symptoms do not get better.  · Your symptoms return after treatment.  This information is not intended to replace advice given to you by your health care provider. Make sure you discuss any questions you have with your health care provider.  Document Released: 04/10/2017 Document Revised: 05/25/2017 Document Reviewed: 12/30/2015  © 2017 Elsevier    Prednisolone tablets  What is this medicine?  PREDNISOLONE (pred NISS oh lone) is a corticosteroid. It is commonly used to treat inflammation of the skin, joints, lungs, and other organs. Common conditions treated include asthma, allergies, and arthritis. It is also used for other conditions, such as blood disorders and diseases of the adrenal glands.  This medicine may be used for other purposes; ask your health care provider or pharmacist if you have questions.  COMMON BRAND NAME(S): Millipred, Millipred DP, Millipred DP 12-Day, Millipred DP 6 Day, Prednoral  What should I tell my health care provider before I take this medicine?  They need  to know if you have any of these conditions:  -Cushing's syndrome  -diabetes  -glaucoma  -heart problems or disease  -high blood pressure  -infection such as herpes, measles, tuberculosis, or chickenpox  -kidney disease  -liver disease  -mental problems  -myasthenia gravis  -osteoporosis  -seizures  -stomach ulcer or intestine disease including colitis and diverticulitis  -thyroid problem  -an unusual or allergic reaction to lactose, prednisolone, other medicines, foods, dyes, or preservatives  -pregnant or trying to get pregnant  -breast-feeding  How should I use this medicine?  Take this medicine by mouth with a glass of water. Follow the directions on the prescription label. Take it with food or milk to avoid stomach upset. If you are taking this medicine once a day, take it in the morning. Do not take more medicine than you are told to take. Do not suddenly stop taking your medicine because you may develop a severe reaction. Your doctor will tell you how much medicine to take. If your doctor wants you to stop the medicine, the dose may be slowly lowered over time to avoid any side effects.  Talk to your pediatrician regarding the use of this medicine in children. Special care may be needed.  Overdosage: If you think you have taken too much of this medicine contact a poison control center or emergency room at once.  NOTE: This medicine is only for you. Do not share this medicine with others.  What if I miss a dose?  If you miss a dose, take it as soon as you can. If it is almost time for your next dose, take only that dose. Do not take double or extra doses.  What may interact with this medicine?  Do not take this medicine with any of the following medications:  -metyrapone  -mifepristone  This medicine may also interact with the following medications:  -aminoglutethimide  -amphotericin B  -aspirin and aspirin-like medicines  -barbiturates  -certain medicines for diabetes, like glipizide or  glyburide  -cholestyramine  -cholinesterase inhibitors  -cyclosporine  -digoxin  -diuretics  -ephedrine  -female hormones, like estrogens and birth control pills  -isoniazid  -ketoconazole  -NSAIDS, medicines for pain and inflammation, like ibuprofen or naproxen  -phenytoin  -rifampin  -toxoids  -vaccines  -warfarin  This list may not describe all possible interactions. Give your health care provider a list of all the medicines, herbs, non-prescription drugs, or dietary supplements you use. Also tell them if you smoke, drink alcohol, or use illegal drugs. Some items may interact with your medicine.  What should I watch for while using this medicine?  Visit your doctor or health care professional for regular checks on your progress. If you are taking this medicine over a prolonged period, carry an identification card with your name and address, the type and dose of your medicine, and your doctor's name and address.  This medicine may increase your risk of getting an infection. Tell your doctor or health care professional if you are around anyone with measles or chickenpox, or if you develop sores or blisters that do not heal properly.  If you are going to have surgery, tell your doctor or health care professional that you have taken this medicine within the last twelve months.  Ask your doctor or health care professional about your diet. You may need to lower the amount of salt you eat.  This medicine may affect blood sugar levels. If you have diabetes, check with your doctor or health care professional before you change your diet or the dose of your diabetic medicine.  What side effects may I notice from receiving this medicine?  Side effects that you should report to your doctor or health care professional as soon as possible:  -allergic reactions like skin rash, itching or hives, swelling of the face, lips, or tongue  -changes in emotions or moods  -changes in vision  -eye pain  -signs and symptoms of high blood  sugar such as dizziness; dry mouth; dry skin; fruity breath; nausea; stomach pain; increased hunger or thirst; increased urination  -signs and symptoms of infection like fever or chills; cough; sore throat; pain or trouble passing urine  -slow growth in children (if used for longer periods of time)  -swelling of ankles, feet  -trouble sleeping  -unusually weak or tired  -weak bones (if used for longer periods of time)  Side effects that usually do not require medical attention (report to your doctor or health care professional if they continue or are bothersome):  -increased hunger  -nausea  -skin problems, acne, thin and shiny skin  -upset stomach  -weight gain  This list may not describe all possible side effects. Call your doctor for medical advice about side effects. You may report side effects to FDA at 8-554-FDA-5322.  Where should I keep my medicine?  Keep out of the reach of children.  Store at room temperature between 15 and 30 degrees C (59 and 86 degrees F). Keep container tightly closed. Throw away any unused medicine after the expiration date.  NOTE: This sheet is a summary. It may not cover all possible information. If you have questions about this medicine, talk to your doctor, pharmacist, or health care provider.  © 2018 Elsevier/Gold Standard (2017-01-19 12:30:30)    Calcium; Vitamin D oral tablets  What is this medicine?  CALCIUM; VITAMIN D (WENCESLAO see um; VYE ta min D) is a vitamin supplement. It is used to prevent conditions of low calcium and vitamin D.  This medicine may be used for other purposes; ask your health care provider or pharmacist if you have questions.  COMMON BRAND NAME(S): Calcarb 600 with Vitamin D, Calcet Plus Vitamin D, Calcitrate + D, Calcium Citrate + D3 Maximum, Calcium Citrate Maximum with D, Caltrate, Caltrate 600+D, Citracal + D, Citracal MAXIMUM + D, Citracal Petites with Vitamin D, Citrus Calcium Plus D, OSCAL 500 + D, OSCAL Calcium + D3, OSCAL Extra D3, Osteo-Poretical,  Oysco 500 + D, Oysco D, Oystercal-D Calcium  What should I tell my health care provider before I take this medicine?  They need to know if you have any of these conditions:  -constipation  -dehydration  -heart disease  -high level of calcium or vitamin D in the blood  -high level of phosphate in the blood  -kidney disease  -kidney stones  -liver disease  -parathyroid disease  -sarcoidosis  -stomach ulcer or obstruction  -an unusual or allergic reaction to calcium, vitamin D, tartrazine dye, other medicines, foods, dyes, or preservatives  -pregnant or trying to get pregnant  -breast-feeding  How should I use this medicine?  Take this medicine by mouth with a glass of water. Follow the directions on the label. Take with food or within 1 hour after a meal. Take your medicine at regular intervals. Do not take your medicine more often than directed.  Talk to your pediatrician regarding the use of this medicine in children. While this medicine may be used in children for selected conditions, precautions do apply.  Overdosage: If you think you have taken too much of this medicine contact a poison control center or emergency room at once.  NOTE: This medicine is only for you. Do not share this medicine with others.  What if I miss a dose?  If you miss a dose, take it as soon as you can. If it is almost time for your next dose, take only that dose. Do not take double or extra doses.  What may interact with this medicine?  Do not take this medicine with any of the following medications:  -ammonium chloride  -methenamine  This medicine may also interact with the following medications:  -antibiotics like ciprofloxacin, gatifloxacin, tetracycline  -captopril  -delavirdine  -diuretics  -gabapentin  -iron supplements  -medicines for fungal infections like ketoconazole and itraconazole  -medicines for seizures like ethotoin and phenytoin  -mineral oil  -mycophenolate  -other vitamins with calcium, vitamin D, or  minerals  -quinidine  -rosuvastatin  -sucralfate  -thyroid medicine  This list may not describe all possible interactions. Give your health care provider a list of all the medicines, herbs, non-prescription drugs, or dietary supplements you use. Also tell them if you smoke, drink alcohol, or use illegal drugs. Some items may interact with your medicine.  What should I watch for while using this medicine?  Taking this medicine is not a substitute for a well-balanced diet and exercise. Talk with your doctor or health care provider and follow a healthy lifestyle.  Do not take this medicine with high-fiber foods, large amounts of alcohol, or drinks containing caffeine. Do not take this medicine within 2 hours of any other medicines.  What side effects may I notice from receiving this medicine?  Side effects that you should report to your doctor or health care professional as soon as possible:  -allergic reactions like skin rash, itching or hives, swelling of the face, lips, or tongue  -confusion  -dry mouth  -high blood pressure  -increased hunger or thirst  -increased urination  -irregular heartbeat  -metallic taste  -muscle or bone pain  -pain when urinating  -seizure  -unusually weak or tired  -weight loss  Side effects that usually do not require medical attention (report to your doctor or health care professional if they continue or are bothersome):  -constipation  -diarrhea  -headache  -loss of appetite  -nausea, vomiting  -stomach upset  This list may not describe all possible side effects. Call your doctor for medical advice about side effects. You may report side effects to FDA at 3-962-FDA-2538.  Where should I keep my medicine?  Keep out of the reach of children.  Store at room temperature between 15 and 30 degrees C (59 and 86 degrees F). Protect from light. Keep container tightly closed. Throw away any unused medicine after the expiration date.  NOTE: This sheet is a summary. It may not cover all possible  information. If you have questions about this medicine, talk to your doctor, pharmacist, or health care provider.  © 2018 Elsevier/Gold Standard (2009-04-01 17:56:23)        Depression / Suicide Risk    As you are discharged from this [facility] facility, it is important to learn how to keep safe from harming yourself.    Recognize the warning signs:  · Abrupt changes in personality, positive or negative- including increase in energy   · Giving away possessions  · Change in eating patterns- significant weight changes-  positive or negative  · Change in sleeping patterns- unable to sleep or sleeping all the time   · Unwillingness or inability to communicate  · Depression  · Unusual sadness, discouragement and loneliness  · Talk of wanting to die  · Neglect of personal appearance   · Rebelliousness- reckless behavior  · Withdrawal from people/activities they love  · Confusion- inability to concentrate     If you or a loved one observes any of these behaviors or has concerns about self-harm, here's what you can do:  · Talk about it- your feelings and reasons for harming yourself  · Remove any means that you might use to hurt yourself (examples: pills, rope, extension cords, firearm)  · Get professional help from the community (Mental Health, Substance Abuse, psychological counseling)  · Do not be alone:Call your Safe Contact- someone whom you trust who will be there for you.  · Call your local CRISIS HOTLINE 343-2776 or 832-650-7460  · Call your local Children's Mobile Crisis Response Team [place] (040) 921-0581 or www.3Jam  · Call the toll free National Suicide Prevention Hotlines   · National Suicide Prevention Lifeline 958-714-HOWC (8577)  · National Hope Line Network 800-SUICIDE (016-5827)

## 2018-03-13 ENCOUNTER — PATIENT OUTREACH (OUTPATIENT)
Dept: HEALTH INFORMATION MANAGEMENT | Facility: OTHER | Age: 58
End: 2018-03-13

## 2018-03-13 NOTE — PROGRESS NOTES
3/13/18 11:20am  CM post discharge outreach call.  CM unable to complete post discharge call.  Patient hung up telephone after CM provided introduction. CM noted in Epic that patient cancelled appointment at post discharge clinic.

## 2018-03-16 ENCOUNTER — TELEPHONE (OUTPATIENT)
Dept: MEDICAL GROUP | Facility: LAB | Age: 58
End: 2018-03-16

## 2018-03-16 DIAGNOSIS — R10.9 ABDOMINAL PAIN, UNSPECIFIED ABDOMINAL LOCATION: ICD-10-CM

## 2018-03-16 NOTE — TELEPHONE ENCOUNTER
Was seen 5/2017,   Had thrombocytopenia, started seeing Dr. Coronado.   Per pt Was in hospital a few times, was found to have H. Pylori and was treated with 3 abx and PPI.   She recently started having stomach issues, was tested for h pylori. She just received her lab results from [facility] and per pt it was positive.   She called Dr. Coronado office but he is on vacation and she was told they could not help her and to call PCP for abx.   I last saw pt 5/2017.  I called Dr. Coronado office- they stated they had a lot of trouble talking with pt. They tried schedule f/u but pt was angery    per SANTO Hutson at Dr. Coronado, there was an ordered blood test for h pylori while in the hospital and told to f/u with PCP or in office, they do not see any results and pt refused to f/u in office visit since I guess Dr. Coronado agrues with her over this subject and tells her to see PCP.   Today pt was asked to schedule appt with Dr. Coronado- pt refused  Pt wanted then to call Dr. Coronado on his vacation.   Pt stated she didn't want to be seen and was unkind to office staff.     Please call pt. She needs an appt.   She has canceled and no shown appt here. We do need to see her.   Please call [facility] and see if you can get latest h pylori test. As long as the test is postive and she has an appt on the books I will most likely send in abx for her.   Thank you  Brenda

## 2018-03-16 NOTE — TELEPHONE ENCOUNTER
Patient has appointment for 3/20/18 at 10:20 am    Patient would like to have labs ordered to test for H. Pylori and have them faxed to [facility] so she can do them tomorrow that we can hopeful get before her appointment

## 2018-03-16 NOTE — TELEPHONE ENCOUNTER
1. Caller Name: Pily                                         Call Back Number: [phone]      Patient approves a detailed voicemail message: yes    Pt calling stating her provider that prescribed her medication is on vacation and she needs antibiotics for H.Pylori  infection, stomach bacteria. Dr. Coronado's office advised her to call here for assistance. Please advise

## 2018-03-16 NOTE — TELEPHONE ENCOUNTER
Please inform pt that I ordered the blood test for h pylori.   The stool test tends to be a better test.   Also we could get a false negative if she has been taking PPIs over the last 2 weeks.   If she has, recommend waiting to get the test done.   Thank you  Brenda

## 2018-03-19 ENCOUNTER — TELEPHONE (OUTPATIENT)
Dept: MEDICAL GROUP | Facility: LAB | Age: 58
End: 2018-03-19

## 2018-03-19 NOTE — TELEPHONE ENCOUNTER
Call from Dr. Coronado since he found out pt was going to seen tomorrow.   He has been trying to see pt but she refuses to make appts.   He is very concerned for pt and has asked her to go to ER for IV steroids. He would also like to start a treatment with her but does need to do the splenectomy vaccines prior to starting the rotexion treatment- he is not suggesting getting splenectomy.   He would like her to be referred to onc asap ([facility] or [facility])   Asked if we can get STAT CBC when she is here.   Dr. Coronado would like f/u call after her visit if possible.   Cell # was given.   Brenda

## 2018-03-20 ENCOUNTER — TELEPHONE (OUTPATIENT)
Dept: MEDICAL GROUP | Facility: LAB | Age: 58
End: 2018-03-20

## 2018-03-20 ENCOUNTER — OFFICE VISIT (OUTPATIENT)
Dept: MEDICAL GROUP | Facility: LAB | Age: 58
End: 2018-03-20
Payer: COMMERCIAL

## 2018-03-20 VITALS
BODY MASS INDEX: 25.39 KG/M2 | HEART RATE: 89 BPM | WEIGHT: 158 LBS | SYSTOLIC BLOOD PRESSURE: 122 MMHG | HEIGHT: 66 IN | OXYGEN SATURATION: 98 % | RESPIRATION RATE: 12 BRPM | DIASTOLIC BLOOD PRESSURE: 76 MMHG | TEMPERATURE: 98.7 F

## 2018-03-20 DIAGNOSIS — D69.6 THROMBOCYTOPENIA (HCC): ICD-10-CM

## 2018-03-20 DIAGNOSIS — A04.8 H. PYLORI INFECTION: ICD-10-CM

## 2018-03-20 PROCEDURE — 99215 OFFICE O/P EST HI 40 MIN: CPT | Performed by: PHYSICIAN ASSISTANT

## 2018-03-20 RX ORDER — OMEPRAZOLE 20 MG/1
20 CAPSULE, DELAYED RELEASE ORAL 2 TIMES DAILY
Qty: 60 CAP | Refills: 0 | Status: SHIPPED | OUTPATIENT
Start: 2018-03-20 | End: 2018-03-21 | Stop reason: SDUPTHER

## 2018-03-20 RX ORDER — PANTOPRAZOLE SODIUM 20 MG/1
20 TABLET, DELAYED RELEASE ORAL 2 TIMES DAILY
Qty: 28 TAB | Refills: 0 | Status: SHIPPED | OUTPATIENT
Start: 2018-03-20 | End: 2018-03-23

## 2018-03-20 RX ORDER — AMOXICILLIN 500 MG/1
1000 CAPSULE ORAL 2 TIMES DAILY
Qty: 56 CAP | Refills: 0 | Status: SHIPPED | OUTPATIENT
Start: 2018-03-20 | End: 2018-04-03

## 2018-03-20 RX ORDER — CLARITHROMYCIN 500 MG/1
500 TABLET, COATED ORAL 2 TIMES DAILY
Qty: 28 TAB | Refills: 0 | Status: SHIPPED | OUTPATIENT
Start: 2018-03-20 | End: 2018-04-03

## 2018-03-20 RX ORDER — METRONIDAZOLE 500 MG/1
500 TABLET ORAL 3 TIMES DAILY
Qty: 42 TAB | Refills: 0 | Status: SHIPPED | OUTPATIENT
Start: 2018-03-20 | End: 2018-04-03

## 2018-03-20 ASSESSMENT — PAIN SCALES - GENERAL: PAINLEVEL: NO PAIN

## 2018-03-20 NOTE — PROGRESS NOTES
Subjective:     Pily Anderson is a 57 y.o. 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 Coronado although pt has not been going in to see him.   She stated she knows when her platelets are low and that's when she will go in.   She believes her ITP is caused by the h pylori.   7/2017 + h pylori and was treated with PPI, amoxicillin, clarithromycin.  Was getting better.   Then 2/2018 started having abdominal pain, bloating, burping, GERD.   BM is thin but stated no diarrhea, blood in stool  Denies HA, fever, chills, CP, SOB, dysuria, freq, hematuria.   No current petichia.   She went to Dr. Hilton office beginning of 3/2018 platelets were 5.  Was sent to hospital.   She is now on prednisone 20mg BID for 14 days and has 3 days left.   From [facility]- 3/10/18 CBC platelets 76.   H pylori- IgG 2.77, IgA <9.0, IgM < 9.0.   Today pt stated she will not go back to Dr. Coronado because she knows treating her h pylori will cure her ITP and therefore she does not want to do splenectomy or take rotexion.   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      Current medicines (including changes today)  Current Outpatient Prescriptions   Medication Sig Dispense Refill   • amoxicillin (AMOXIL) 500 MG Cap Take 2 Caps by mouth 2 times a day for 14 days. 56 Cap 0   • metroNIDAZOLE (FLAGYL) 500 MG Tab Take 1 Tab by mouth 3 times a day for 14 days. 42 Tab 0   • clarithromycin (BIAXIN) 500 MG Tab Take 1 Tab by mouth 2 times a day for 14 days. 28 Tab 0   • pantoprazole (PROTONIX) 20 MG tablet Take 1 Tab by mouth 2 times a day for 14 days. 28 Tab 0   • calcium-vitamin D (OSCAL 500 +D) 500-200 MG-UNIT Tab Take 2 Tabs by mouth 2 times a day, with meals. 100 Tab 0   • predniSONE (DELTASONE) 20 MG Tab Take 2 Tabs by mouth every day for 14 days. 28 Tab 0   • ascorbic acid (ASCORBIC ACID) 500 MG Tab Take 500-1,000 mg by mouth  "every day. Patient takes 1-2 tablets a day depending on how she feels       No current facility-administered medications for this visit.      She  has no past medical history on file.    ROS   No chest pain, no shortness of breath, no abdominal pain       Objective:     Blood pressure 122/76, pulse 89, temperature 37.1 °C (98.7 °F), resp. rate 12, height 1.676 m (5' 6\"), weight 71.7 kg (158 lb), SpO2 98 %. Body mass index is 25.5 kg/m².   Physical Exam:  Alert, oriented in no acute distress.  Eye contact is good, speech goal directed, affect calm  HEENT: conjunctiva non-injected, sclera non-icteric, PERRL.  Oral mucous membranes pink and moist with no lesions. No petechiae or gum bleeding.   Neck No adenopathy or masses in the neck or supraclavicular regions.  Lungs: clear to auscultation bilaterally with good excursion.  CV: regular rate and rhythm.  Abdomen: soft, nontender, no HSM, No CVAT  Ext: No petechiae noted, no edema, color normal, peripheral pulses 2+, temperature normal      Assessment and Plan:   The following treatment plan was discussed     1. ITP / H. Pylori  [facility] blood work was positive for H. pylori.   Since she already had one treatment in July  Will start regimen- PPI, clarithromycin 500mg BID x14d, amoxicillin 1g twice a day ×14 days, and we will add metronidazole 500 mg twice daily ×14 days.   Although we are treating her H. pylori we still discussed the importance of focusing on her ITP.   We discussed the life threatening possibilities of her platelets being this low. Patient is of sound body and mind, A and O ×3- stated she is okay replacing the hematology oncology referral although she will not make an appointment until after she sees the results from treating H. Pylori.   Pt was also okay with getting updated CBC done today.   This was ordered she will be going to [facility].     I also called Dr. Coronado back to discuss the visit, he stated he is very concerned and will be calling her " today to see if she will return or will also asked her to follow up with a different hematologist and will be talking to her about the importance of this.    Greater than 50% of 40 minutes was spent in face-to-face care and coordination regarding ITP, H. Pylori, researching in locating labs, consulting with hematologist.     Followup: Return in about 4 weeks (around 4/17/2018) for annual.           Please note that this dictation was created using voice recognition software. I have made every reasonable attempt to correct obvious errors, but I expect that there are errors of grammar and possibly content that I did not discover before finalizing the note.

## 2018-03-20 NOTE — ASSESSMENT & PLAN NOTE
Was followed by Dr. Coronado although pt has not been going in to see him.   She stated she knows when her platelets are low and that's when she will go in.   She believes her ITP is caused by the h pylori.   7/2017 + h pylori and was treated with PPI, amoxicillin, clarithromycin.  Was getting better.   Then 2/2018 started having abdominal pain, bloating, burping, GERD.   BM is thin but stated no diarrhea, blood in stool  Denies HA, fever, chills, CP, SOB, dysuria, freq, hematuria.   No current petichia.   She went to Dr. Hilton office beginning of 3/2018 platelets were 5.  Was sent to hospital.   She is now on prednisone 20mg BID for 14 days and has 3 days left.   From [facility]- 3/10/18 CBC platelets 76.   H pylori- IgG 2.77, IgA <9.0, IgM < 9.0.   Today pt stated she will not go back to Dr. Coronado because she knows treating her h pylori will cure her ITP and therefore she does not want to do splenectomy or take rotexion.   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

## 2018-03-20 NOTE — TELEPHONE ENCOUNTER
Pharmacy states pantoprazole 20 MG is not covered by pt's insurance. Please change medication or start PA  Please advise

## 2018-03-21 ENCOUNTER — TELEPHONE (OUTPATIENT)
Dept: MEDICAL GROUP | Facility: LAB | Age: 58
End: 2018-03-21

## 2018-03-21 DIAGNOSIS — A04.8 H. PYLORI INFECTION: ICD-10-CM

## 2018-03-21 NOTE — TELEPHONE ENCOUNTER
Please inform pt that it is hematology who is treating her for her low platelets and I would feel much more comfortable if she continues to be followed by them. Dr. Coronado is very concerned for her and will be able to help her out with that question.   Thank you  Brenda

## 2018-03-21 NOTE — TELEPHONE ENCOUNTER
1. Caller Name: Pily                                       Call Back Number: [phone]      Patient approves a detailed voicemail message: yes    Pt is inquiring on her predniSONE  20 mg. She is asking if she should refill because she is about to run out. If so, still take 20 mg or go down to 10 mg. Please advise

## 2018-03-22 LAB
BASOPHILS # BLD AUTO: 0 X10E3/UL (ref 0–0.2)
BASOPHILS NFR BLD AUTO: 0 %
EOSINOPHIL # BLD AUTO: 0 X10E3/UL (ref 0–0.4)
EOSINOPHIL NFR BLD AUTO: 0 %
ERYTHROCYTE [DISTWIDTH] IN BLOOD BY AUTOMATED COUNT: 14 % (ref 12.3–15.4)
HCT VFR BLD AUTO: 42.6 % (ref 34–46.6)
HGB BLD-MCNC: 14 G/DL (ref 11.1–15.9)
IMM GRANULOCYTES # BLD: ABNORMAL 10*3/UL
IMM GRANULOCYTES NFR BLD: ABNORMAL %
IMMATURE CELLS  115398: ABNORMAL
LYMPHOCYTES # BLD AUTO: 1.2 X10E3/UL (ref 0.7–3.1)
LYMPHOCYTES NFR BLD AUTO: 9 %
MCH RBC QN AUTO: 28.7 PG (ref 26.6–33)
MCHC RBC AUTO-ENTMCNC: 32.9 G/DL (ref 31.5–35.7)
MCV RBC AUTO: 88 FL (ref 79–97)
MONOCYTES # BLD AUTO: 0.7 X10E3/UL (ref 0.1–0.9)
MONOCYTES NFR BLD AUTO: 5 %
MORPHOLOGY BLD-IMP: ABNORMAL
NEUTROPHILS # BLD AUTO: 11.3 X10E3/UL (ref 1.4–7)
NEUTROPHILS NFR BLD AUTO: 86 %
NRBC BLD AUTO-RTO: ABNORMAL %
PLATELET # BLD AUTO: 23 X10E3/UL (ref 150–379)
RBC # BLD AUTO: 4.87 X10E6/UL (ref 3.77–5.28)
WBC # BLD AUTO: 13.1 X10E3/UL (ref 3.4–10.8)

## 2018-03-22 RX ORDER — OMEPRAZOLE 20 MG/1
20 CAPSULE, DELAYED RELEASE ORAL 2 TIMES DAILY
Qty: 60 CAP | Refills: 0 | Status: SHIPPED | OUTPATIENT
Start: 2018-03-22

## 2018-03-23 ENCOUNTER — TELEPHONE (OUTPATIENT)
Dept: MEDICAL GROUP | Facility: LAB | Age: 58
End: 2018-03-23

## 2018-03-23 DIAGNOSIS — D69.6 THROMBOCYTOPENIA (HCC): ICD-10-CM

## 2018-03-23 RX ORDER — PREDNISONE 20 MG/1
40 TABLET ORAL 2 TIMES DAILY
Qty: 40 TAB | Refills: 1 | Status: SHIPPED | OUTPATIENT
Start: 2018-03-23 | End: 2018-04-02

## 2018-03-23 NOTE — TELEPHONE ENCOUNTER
----- Message from Brenda Davis P.A.-C. sent at 3/22/2018  5:53 PM PDT -----  No answer, sent [employer] message.     Please try calling pt to make sure she received my message.   Thank you  Brenda

## 2018-03-23 NOTE — TELEPHONE ENCOUNTER
Called patient and informed her of both messages. Patient states that she does not want to go to ER and would just like prednisone or a Corticosteroid prescribed. explained to patient the Dr. Coronado office would know better and that she should contact his office regarding both her low platelet count and prednisone.

## 2018-03-23 NOTE — TELEPHONE ENCOUNTER
Written by Brenda Davis P.A.-C. on 3/22/2018  5:53 PM   Pily,   Your platelets are extremely low!   Please go to ER or call Dr. Coronado.   Brenda      Health Care Proxy (HCP)/Jenny

## 2018-03-23 NOTE — TELEPHONE ENCOUNTER
Brenda Davis P.A.-C.          3/21/18 2:44 PM   Note      Please inform pt that it is hematology who is treating her for her low platelets and I would feel much more comfortable if she continues to be followed by them. Dr. Coronado is very concerned for her and will be able to help her out with that question.   Thank you  Brenda

## 2018-03-23 NOTE — TELEPHONE ENCOUNTER
[facility] pharmacy states pt pantoprazole is not covered by insurance. Can you change? Please advise

## 2018-03-23 NOTE — TELEPHONE ENCOUNTER
MEDICATION PRIOR AUTHORIZATION NEEDED:  DENIED    1. Name of Medication: omeprazole     2. Requested By (Name of Pharmacy): [facility]     3. Is insurance on file current? yes    4. What is the name & phone number of the 3rd party payor? [facility]    EXCLUDED PT CAN BUY OTC

## 2018-03-23 NOTE — TELEPHONE ENCOUNTER
Talk to Dr. Coronado  He stated at platelet 23 she definitely needs to go up on her prednisone since today is her last day of her 2 week dose prednisone 20 mg twice daily course.    He prefers her to be on prednisone 40 mg twice daily for at least a week although he would write the prescription for #50 with one refill.   Stated this is more of a chronic thing from her ITP not from H pylori and will need other treatments.   He will be trying to contact her today, hoped she will either follow up with him or follow-up with another hematologist ASAP.   Recheck CBC next week    Prescription written for prednisone 20 mg, 2 tabs twice daily, recheck CBC one week.   CBC has been ordered  Please call patient and let her know I talked to Dr. Coronado and he recommend increasing prednisone to 40mg BID. (2 tabs 2 times daily) then we will f/u with another CBC next week.   It is very important for her to see hematology ASAP- weather it is Dr. Coronado's office or another hematologist.    Thank you!  Brenda

## 2018-04-19 ENCOUNTER — OUTPATIENT INFUSION SERVICES (OUTPATIENT)
Dept: ONCOLOGY | Facility: MEDICAL CENTER | Age: 58
End: 2018-04-19
Attending: SPECIALIST
Payer: COMMERCIAL

## 2018-04-19 VITALS
BODY MASS INDEX: 26.95 KG/M2 | WEIGHT: 157.85 LBS | HEART RATE: 100 BPM | TEMPERATURE: 97.3 F | OXYGEN SATURATION: 94 % | SYSTOLIC BLOOD PRESSURE: 138 MMHG | HEIGHT: 64 IN | DIASTOLIC BLOOD PRESSURE: 78 MMHG | RESPIRATION RATE: 18 BRPM

## 2018-04-19 PROCEDURE — 306780 HCHG STAT FOR TRANSFUSION PER CASE

## 2018-04-19 NOTE — PROGRESS NOTES
Pt arrived to [facility] ambulatory, here for planned splenic vaccines. Pt requesting more information as to why she was here. Informed pt that MD had ordered splenic vaccines in preparation for spleen removal due to ITP. Pt stated she was not having her spleen removed. 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 Orders shown to pt. In review of orders, pt declined all vaccines. When asked why pt decided to present self for appt, pt responded that Dr. Coronado had told her to come to the appt but did not tell her what the appt was for. Pt appeared upset and verbalized her disinterest in having her spleen removed, having any more Rituxan, or moving forward with care from Dr. Coronado. Emotional support provided and informed pt that she can decline any care offered to her. Pt discharged home under self care in no apparent distress. Call placed to Dr. Coronado's office and informed David that pt refused all vaccines. Per David, she would convey the information to Dr. Coronado. No future appts in place.

## 2018-05-01 ENCOUNTER — TELEPHONE (OUTPATIENT)
Dept: MEDICAL GROUP | Facility: LAB | Age: 58
End: 2018-05-01

## 2018-05-02 NOTE — TELEPHONE ENCOUNTER
Call from Dr. Coronado.   He has now seen pt 3 times since our last talk.   Stated pt platelets were 100 last week then today 75.   Pt has been tapering her prednisone herself.   She was set up for an appt to get all pre vaccinations for splenectomy, pt went but declines vaccines.   Stated she knows when her H pylori is treated she will no longer have this issue.  She does not want any other treatments.   Pt stated she was instead goong back to PCP for treatment. Dr. Coronado stated that PCP will treat her some some things but wanted hematology to help with her ITP. He recommended trying a different hematologist but she declined.   He just called to keep us posted.   Brenda

## 2021-03-15 DIAGNOSIS — Z23 NEED FOR VACCINATION: ICD-10-CM

## 2022-02-14 ENCOUNTER — APPOINTMENT (RX ONLY)
Dept: URBAN - METROPOLITAN AREA CLINIC 67 | Facility: CLINIC | Age: 62
Setting detail: DERMATOLOGY
End: 2022-02-14

## 2022-02-14 DIAGNOSIS — L81.4 OTHER MELANIN HYPERPIGMENTATION: ICD-10-CM

## 2022-02-14 DIAGNOSIS — D22 MELANOCYTIC NEVI: ICD-10-CM

## 2022-02-14 DIAGNOSIS — L82.1 OTHER SEBORRHEIC KERATOSIS: ICD-10-CM

## 2022-02-14 DIAGNOSIS — D18.0 HEMANGIOMA: ICD-10-CM

## 2022-02-14 PROBLEM — D22.5 MELANOCYTIC NEVI OF TRUNK: Status: ACTIVE | Noted: 2022-02-14

## 2022-02-14 PROBLEM — D22.62 MELANOCYTIC NEVI OF LEFT UPPER LIMB, INCLUDING SHOULDER: Status: ACTIVE | Noted: 2022-02-14

## 2022-02-14 PROBLEM — D18.01 HEMANGIOMA OF SKIN AND SUBCUTANEOUS TISSUE: Status: ACTIVE | Noted: 2022-02-14

## 2022-02-14 PROCEDURE — ? COUNSELING

## 2022-02-14 ASSESSMENT — LOCATION SIMPLE DESCRIPTION DERM
LOCATION SIMPLE: LEFT LOWER BACK
LOCATION SIMPLE: LEFT FOREARM
LOCATION SIMPLE: RIGHT FOREARM
LOCATION SIMPLE: UPPER BACK
LOCATION SIMPLE: RIGHT LOWER BACK
LOCATION SIMPLE: LEFT UPPER ARM
LOCATION SIMPLE: RIGHT UPPER ARM

## 2022-02-14 ASSESSMENT — LOCATION DETAILED DESCRIPTION DERM
LOCATION DETAILED: RIGHT INFERIOR LATERAL MIDBACK
LOCATION DETAILED: LEFT PROXIMAL DORSAL FOREARM
LOCATION DETAILED: RIGHT ANTERIOR PROXIMAL UPPER ARM
LOCATION DETAILED: LEFT VENTRAL PROXIMAL FOREARM
LOCATION DETAILED: INFERIOR THORACIC SPINE
LOCATION DETAILED: LEFT ANTERIOR PROXIMAL UPPER ARM
LOCATION DETAILED: LEFT SUPERIOR LATERAL MIDBACK
LOCATION DETAILED: RIGHT VENTRAL DISTAL FOREARM

## 2022-02-14 ASSESSMENT — LOCATION ZONE DERM
LOCATION ZONE: TRUNK
LOCATION ZONE: ARM

## 2022-02-14 NOTE — PROCEDURE: MIPS QUALITY
Quality 110: Preventive Care And Screening: Influenza Immunization: Influenza Immunization not Administered for Documented Reasons.
Quality 47: Advance Care Plan: Advance Care Planning discussed and documented; advance care plan or surrogate decision maker documented in the medical record.
Detail Level: Detailed
Quality 111:Pneumonia Vaccination Status For Older Adults: Pneumococcal Vaccination not Administered or Previously Received, Reason not Otherwise Specified
Quality 431: Preventive Care And Screening: Unhealthy Alcohol Use - Screening: Patient not identified as an unhealthy alcohol user when screened for unhealthy alcohol use using a systematic screening method
Quality 402: Tobacco Use And Help With Quitting Among Adolescents: Patient screened for tobacco and never smoked

## 2022-03-02 ENCOUNTER — APPOINTMENT (RX ONLY)
Dept: URBAN - METROPOLITAN AREA CLINIC 67 | Facility: CLINIC | Age: 62
Setting detail: DERMATOLOGY
End: 2022-03-02

## 2022-03-02 DIAGNOSIS — T07XXXA ABRASION OR FRICTION BURN OF OTHER, MULTIPLE, AND UNSPECIFIED SITES, WITHOUT MENTION OF INFECTION: ICD-10-CM

## 2022-03-02 DIAGNOSIS — Z48.817 ENCOUNTER FOR SURGICAL AFTERCARE FOLLOWING SURGERY ON THE SKIN AND SUBCUTANEOUS TISSUE: ICD-10-CM

## 2022-03-02 PROBLEM — S50.811A ABRASION OF RIGHT FOREARM, INITIAL ENCOUNTER: Status: ACTIVE | Noted: 2022-03-02

## 2022-03-02 PROCEDURE — ? ADDITIONAL NOTES

## 2022-03-02 PROCEDURE — ? COUNSELING

## 2022-03-02 PROCEDURE — ? POST-OP WOUND EVALUATION

## 2022-03-02 ASSESSMENT — LOCATION DETAILED DESCRIPTION DERM
LOCATION DETAILED: RIGHT DISTAL RADIAL DORSAL FOREARM
LOCATION DETAILED: RIGHT DISTAL DORSAL FOREARM
LOCATION DETAILED: RIGHT VENTRAL PROXIMAL FOREARM

## 2022-03-02 ASSESSMENT — LOCATION SIMPLE DESCRIPTION DERM
LOCATION SIMPLE: RIGHT FOREARM
LOCATION SIMPLE: RIGHT FOREARM

## 2022-03-02 ASSESSMENT — LOCATION ZONE DERM
LOCATION ZONE: ARM
LOCATION ZONE: ARM

## 2022-03-02 NOTE — PROCEDURE: ADDITIONAL NOTES
Additional Notes: Lesion has resolved since her previous visit. Patient has no desire for biopsy at this time.
Detail Level: Simple
Render Risk Assessment In Note?: no

## 2022-03-02 NOTE — PROCEDURE: POST-OP WOUND EVALUATION
Detail Level: Simple
Add 06202 Cpt? (Important Note: In 2017 The Use Of 46792 Is Being Tracked By Cms To Determine Future Global Period Reimbursement For Global Periods): yes
Wound Evaluated By (Optional): Yoly Lovelace
Wound Diameter In Cm(Optional): 0
Wound Bruising?: mild

## 2024-10-16 NOTE — ED NOTES
Received report from Ladonna Moseley. Pt care responsibilities assumed. Pt resting in bed, Monitors in place, VSS, will continue to monitor.       Itraconazole Counseling:  I discussed with the patient the risks of itraconazole including but not limited to liver damage, nausea/vomiting, neuropathy, and severe allergy.  The patient understands that this medication is best absorbed when taken with acidic beverages such as non-diet cola or ginger ale.  The patient understands that monitoring is required including baseline LFTs and repeat LFTs at intervals.  The patient understands that they are to contact us or the primary physician if concerning signs are noted.